# Patient Record
Sex: MALE | Race: WHITE | NOT HISPANIC OR LATINO | Employment: FULL TIME | URBAN - METROPOLITAN AREA
[De-identification: names, ages, dates, MRNs, and addresses within clinical notes are randomized per-mention and may not be internally consistent; named-entity substitution may affect disease eponyms.]

---

## 2023-03-14 ENCOUNTER — TELEPHONE (OUTPATIENT)
Dept: PRIMARY CARE | Facility: CLINIC | Age: 73
End: 2023-03-14
Payer: MEDICARE

## 2023-03-14 DIAGNOSIS — M54.50 CHRONIC LOW BACK PAIN, UNSPECIFIED BACK PAIN LATERALITY, UNSPECIFIED WHETHER SCIATICA PRESENT: ICD-10-CM

## 2023-03-14 DIAGNOSIS — M51.36 DEGENERATIVE DISC DISEASE, LUMBAR: Primary | ICD-10-CM

## 2023-03-14 DIAGNOSIS — G89.29 CHRONIC LOW BACK PAIN, UNSPECIFIED BACK PAIN LATERALITY, UNSPECIFIED WHETHER SCIATICA PRESENT: ICD-10-CM

## 2023-03-14 NOTE — TELEPHONE ENCOUNTER
Both legs are still bothering him , has done physical therapy & has another scheduled tomorrow.    He is asking about getting MRI done

## 2023-03-30 ENCOUNTER — TELEPHONE (OUTPATIENT)
Dept: PRIMARY CARE | Facility: CLINIC | Age: 73
End: 2023-03-30
Payer: MEDICARE

## 2023-03-30 DIAGNOSIS — F41.9 ANXIETY: Primary | ICD-10-CM

## 2023-03-30 RX ORDER — ALPRAZOLAM 0.5 MG/1
0.5 TABLET ORAL SEE ADMIN INSTRUCTIONS
Qty: 2 TABLET | Refills: 0 | Status: SHIPPED | OUTPATIENT
Start: 2023-03-30 | End: 2023-06-29 | Stop reason: ALTCHOICE

## 2023-03-30 NOTE — TELEPHONE ENCOUNTER
Patient has an MRI in April. Patient states he hates small spaces. Patient had  Alprazolam 0.5mg before having one done before. Requesting 2 tabs for the MRI. I tab 45 mins to 30 mins before procedure Can repeat immediately before procedure if needed.

## 2023-04-11 ENCOUNTER — TELEPHONE (OUTPATIENT)
Dept: PRIMARY CARE | Facility: CLINIC | Age: 73
End: 2023-04-11
Payer: MEDICARE

## 2023-04-11 DIAGNOSIS — M51.36 DEGENERATIVE DISC DISEASE, LUMBAR: Primary | ICD-10-CM

## 2023-04-11 RX ORDER — CELECOXIB 200 MG/1
200 CAPSULE ORAL 2 TIMES DAILY
Qty: 60 CAPSULE | Refills: 5 | Status: SHIPPED | OUTPATIENT
Start: 2023-04-11 | End: 2023-10-08

## 2023-06-28 PROBLEM — I25.10 ASHD (ARTERIOSCLEROTIC HEART DISEASE): Status: ACTIVE | Noted: 2023-06-28

## 2023-06-28 PROBLEM — Z98.61 H/O PERCUTANEOUS TRANSLUMINAL CORONARY ANGIOPLASTY: Status: ACTIVE | Noted: 2023-06-28

## 2023-06-28 PROBLEM — E55.9 VITAMIN D DEFICIENCY: Status: ACTIVE | Noted: 2023-06-28

## 2023-06-28 PROBLEM — H66.90 SUBACUTE OTITIS MEDIA: Status: RESOLVED | Noted: 2023-06-28 | Resolved: 2023-06-28

## 2023-06-28 PROBLEM — H60.90 OTITIS EXTERNA: Status: RESOLVED | Noted: 2023-06-28 | Resolved: 2023-06-28

## 2023-06-28 PROBLEM — H90.3 SENSORINEURAL HEARING LOSS, BILATERAL: Status: ACTIVE | Noted: 2023-06-28

## 2023-06-28 PROBLEM — M62.81 MUSCLE WEAKNESS OF LOWER EXTREMITY: Status: ACTIVE | Noted: 2023-06-28

## 2023-06-28 PROBLEM — E78.5 HYPERLIPIDEMIA: Status: ACTIVE | Noted: 2023-06-28

## 2023-06-28 PROBLEM — R41.841 COGNITIVE COMMUNICATION DEFICIT: Status: ACTIVE | Noted: 2023-06-28

## 2023-06-28 PROBLEM — M79.606 LOWER EXTREMITY PAIN: Status: ACTIVE | Noted: 2023-06-28

## 2023-06-28 PROBLEM — M54.9 BACK PAIN: Status: ACTIVE | Noted: 2023-06-28

## 2023-06-28 PROBLEM — Z96.21 COCHLEAR IMPLANT IN PLACE: Status: ACTIVE | Noted: 2023-06-28

## 2023-06-28 PROBLEM — H91.90 HEARING LOSS: Status: RESOLVED | Noted: 2023-06-28 | Resolved: 2023-06-28

## 2023-06-28 PROBLEM — R10.84 ABDOMINAL PAIN, ACUTE, GENERALIZED: Status: RESOLVED | Noted: 2023-06-28 | Resolved: 2023-06-28

## 2023-06-28 RX ORDER — GABAPENTIN 300 MG/1
300 CAPSULE ORAL NIGHTLY
COMMUNITY
Start: 2023-01-26 | End: 2023-06-29

## 2023-06-28 RX ORDER — ASPIRIN 81 MG/1
1 TABLET ORAL DAILY
COMMUNITY
Start: 2016-09-07 | End: 2024-04-04 | Stop reason: WASHOUT

## 2023-06-28 RX ORDER — OMEGA-3/DHA/EPA/FISH OIL 300-1000MG
1 CAPSULE,DELAYED RELEASE (ENTERIC COATED) ORAL DAILY
COMMUNITY
Start: 2016-09-06 | End: 2024-04-04 | Stop reason: WASHOUT

## 2023-06-28 RX ORDER — MULTIVITAMIN
1 TABLET ORAL DAILY
COMMUNITY
Start: 2016-09-12

## 2023-06-28 RX ORDER — CYCLOBENZAPRINE HCL 5 MG
1 TABLET ORAL NIGHTLY PRN
COMMUNITY
Start: 2023-02-15 | End: 2023-06-29

## 2023-06-28 RX ORDER — METOPROLOL TARTRATE 25 MG/1
0.5 TABLET, FILM COATED ORAL 2 TIMES DAILY
COMMUNITY
Start: 2016-05-28 | End: 2024-05-16 | Stop reason: SDUPTHER

## 2023-06-28 RX ORDER — ALIROCUMAB 75 MG/ML
INJECTION, SOLUTION SUBCUTANEOUS
COMMUNITY
End: 2024-01-30 | Stop reason: CLARIF

## 2023-06-29 ENCOUNTER — OFFICE VISIT (OUTPATIENT)
Dept: PRIMARY CARE | Facility: CLINIC | Age: 73
End: 2023-06-29
Payer: MEDICARE

## 2023-06-29 VITALS
BODY MASS INDEX: 28.61 KG/M2 | HEIGHT: 66 IN | SYSTOLIC BLOOD PRESSURE: 124 MMHG | WEIGHT: 178 LBS | DIASTOLIC BLOOD PRESSURE: 60 MMHG | HEART RATE: 49 BPM

## 2023-06-29 DIAGNOSIS — Z12.5 PROSTATE CANCER SCREENING: ICD-10-CM

## 2023-06-29 DIAGNOSIS — Z96.21 COCHLEAR IMPLANT IN PLACE: ICD-10-CM

## 2023-06-29 DIAGNOSIS — E78.2 MIXED HYPERLIPIDEMIA: Primary | ICD-10-CM

## 2023-06-29 DIAGNOSIS — M54.50 LOW BACK PAIN, UNSPECIFIED BACK PAIN LATERALITY, UNSPECIFIED CHRONICITY, UNSPECIFIED WHETHER SCIATICA PRESENT: ICD-10-CM

## 2023-06-29 DIAGNOSIS — E55.9 VITAMIN D DEFICIENCY: ICD-10-CM

## 2023-06-29 DIAGNOSIS — I25.10 ASHD (ARTERIOSCLEROTIC HEART DISEASE): ICD-10-CM

## 2023-06-29 PROCEDURE — 1160F RVW MEDS BY RX/DR IN RCRD: CPT | Performed by: CLINICAL NURSE SPECIALIST

## 2023-06-29 PROCEDURE — 1036F TOBACCO NON-USER: CPT | Performed by: CLINICAL NURSE SPECIALIST

## 2023-06-29 PROCEDURE — 99214 OFFICE O/P EST MOD 30 MIN: CPT | Performed by: CLINICAL NURSE SPECIALIST

## 2023-06-29 PROCEDURE — 1159F MED LIST DOCD IN RCRD: CPT | Performed by: CLINICAL NURSE SPECIALIST

## 2023-06-29 ASSESSMENT — ENCOUNTER SYMPTOMS
DYSURIA: 0
BLOOD IN STOOL: 0
OCCASIONAL FEELINGS OF UNSTEADINESS: 0
SHORTNESS OF BREATH: 0
PALPITATIONS: 0
DIZZINESS: 0
WHEEZING: 0
CHILLS: 0
HEADACHES: 0
FEVER: 0
SLEEP DISTURBANCE: 0
MYALGIAS: 0
CONFUSION: 0
NECK PAIN: 0
FLANK PAIN: 0
SEIZURES: 0
FATIGUE: 0
DEPRESSION: 0
LOSS OF SENSATION IN FEET: 0
JOINT SWELLING: 0
BRUISES/BLEEDS EASILY: 0
APPETITE CHANGE: 0
PHOTOPHOBIA: 0
VOMITING: 0
UNEXPECTED WEIGHT CHANGE: 0
CONSTIPATION: 0
EYE PAIN: 0
NAUSEA: 0
ARTHRALGIAS: 1
POLYDIPSIA: 0
SORE THROAT: 0
ABDOMINAL PAIN: 0
COUGH: 0
CHEST TIGHTNESS: 0
TROUBLE SWALLOWING: 0
ACTIVITY CHANGE: 0
HEMATURIA: 0
BACK PAIN: 1
WOUND: 0
DIARRHEA: 0

## 2023-06-29 ASSESSMENT — COLUMBIA-SUICIDE SEVERITY RATING SCALE - C-SSRS
1. IN THE PAST MONTH, HAVE YOU WISHED YOU WERE DEAD OR WISHED YOU COULD GO TO SLEEP AND NOT WAKE UP?: NO
6. HAVE YOU EVER DONE ANYTHING, STARTED TO DO ANYTHING, OR PREPARED TO DO ANYTHING TO END YOUR LIFE?: NO
2. HAVE YOU ACTUALLY HAD ANY THOUGHTS OF KILLING YOURSELF?: NO

## 2023-06-29 ASSESSMENT — PATIENT HEALTH QUESTIONNAIRE - PHQ9
SUM OF ALL RESPONSES TO PHQ9 QUESTIONS 1 AND 2: 0
1. LITTLE INTEREST OR PLEASURE IN DOING THINGS: NOT AT ALL
2. FEELING DOWN, DEPRESSED OR HOPELESS: NOT AT ALL

## 2023-06-29 NOTE — PROGRESS NOTES
Subjective   Patient ID: Joe Pathak is a 72 y.o. male who presents for Follow-up (Follow up).  HPI    Here today as a follow up appointment.     Follows with Dr. Hernandez for Cardiology. CAD PCI in the past. Has been stable on medication, intolerant to Statins. No changes made at last OV in February 2023, following annually.     Patient states that he has developed lower extremity pain. Extending from the buttocks down his legs. Has taken OTC pain medication with some improvement. Worse when trying to sleep at night. Patient states that when he is up in moving will help with his symptoms. No improvement with Gabapentin. Completed PT with minimal improvement. Flexeril PRN. Has noticed improvement with Celebrex. MRI completed.    Review of Systems   Constitutional:  Negative for activity change, appetite change, chills, fatigue, fever and unexpected weight change.   HENT:  Negative for ear pain, hearing loss, nosebleeds, sore throat, tinnitus and trouble swallowing.    Eyes:  Negative for photophobia, pain and visual disturbance.   Respiratory:  Negative for cough, chest tightness, shortness of breath and wheezing.    Cardiovascular:  Negative for chest pain, palpitations and leg swelling.   Gastrointestinal:  Negative for abdominal pain, blood in stool, constipation, diarrhea, nausea and vomiting.   Endocrine: Negative for cold intolerance, heat intolerance, polydipsia and polyuria.   Genitourinary:  Negative for dysuria, flank pain and hematuria.   Musculoskeletal:  Positive for arthralgias and back pain. Negative for joint swelling, myalgias and neck pain.   Skin:  Negative for pallor, rash and wound.   Allergic/Immunologic: Negative for immunocompromised state.   Neurological:  Negative for dizziness, seizures and headaches.   Hematological:  Does not bruise/bleed easily.   Psychiatric/Behavioral:  Negative for confusion and sleep disturbance.        Objective   Physical Exam  Vitals and nursing note  reviewed.   Constitutional:       General: He is not in acute distress.     Appearance: Normal appearance.   HENT:      Head: Normocephalic.      Nose: Nose normal.   Eyes:      Conjunctiva/sclera: Conjunctivae normal.   Neck:      Vascular: No carotid bruit.   Cardiovascular:      Rate and Rhythm: Normal rate and regular rhythm.      Pulses: Normal pulses.      Heart sounds: Normal heart sounds.   Pulmonary:      Effort: Pulmonary effort is normal.      Breath sounds: Normal breath sounds.   Abdominal:      General: Bowel sounds are normal.      Palpations: Abdomen is soft.   Musculoskeletal:         General: Normal range of motion.      Cervical back: Normal range of motion.   Skin:     General: Skin is warm and dry.   Neurological:      Mental Status: He is alert and oriented to person, place, and time. Mental status is at baseline.   Psychiatric:         Mood and Affect: Mood normal.         Behavior: Behavior normal.       Assessment/Plan        Reviewed lab work completed with patient.     Coronary artery disease with history of stent placement 2013. Currently asymptomatic. He continues on aspirin and beta-blocker. He has been statin intolerant. Blood pressure well controlled. He has follow-up with cardiology scheduled.   Dyslipidemia. History of statin myopathy. Currently doing well on Praluent since 2020.   Hearing loss status post cochlear implant. Following with Specialist.   Lower Extremity Pain, Back Pain: Discontinued Gabapentin without relief. Flexeril PRN. MRI completed. Continue Celebrex.     Colonoscopy August 2014 normal, repeat in 10 years.  PSA: February 2022. To be done with next lab work.   COVID Vaccine: December 2022.   Medicare Wellness: December 2022. To be done at follow up appointment.   Pneumovax: April 2021.   Discussed Prevnar, Shingrix, and Tdap.

## 2023-10-25 ENCOUNTER — TELEPHONE (OUTPATIENT)
Dept: PRIMARY CARE | Facility: CLINIC | Age: 73
End: 2023-10-25
Payer: MEDICARE

## 2023-10-25 DIAGNOSIS — M54.50 LOW BACK PAIN, UNSPECIFIED BACK PAIN LATERALITY, UNSPECIFIED CHRONICITY, UNSPECIFIED WHETHER SCIATICA PRESENT: ICD-10-CM

## 2023-10-25 RX ORDER — CELECOXIB 200 MG/1
200 CAPSULE ORAL 2 TIMES DAILY
Qty: 180 CAPSULE | Refills: 1 | Status: SHIPPED | OUTPATIENT
Start: 2023-10-25 | End: 2024-04-22

## 2023-11-20 ENCOUNTER — TELEPHONE (OUTPATIENT)
Dept: CARDIOLOGY | Facility: CLINIC | Age: 73
End: 2023-11-20
Payer: MEDICARE

## 2023-11-20 DIAGNOSIS — I49.8 FLUTTERING HEART: ICD-10-CM

## 2023-11-20 DIAGNOSIS — I25.10 ASHD (ARTERIOSCLEROTIC HEART DISEASE): Primary | ICD-10-CM

## 2023-11-20 NOTE — TELEPHONE ENCOUNTER
"Received message from Naseem, \"Percy stopped down stating that this weekend he noticed his heart fluttering. It would come and go throughout the day. He states that it felt like that when he needed his stents. He denies any other symptoms. Just the weird feeling in his chest. \"  "

## 2023-11-20 NOTE — TELEPHONE ENCOUNTER
Last seen 2/27/23 by Dr. Hernandez and has PMH is significant for CAD s/p PCI to proximal LAD and proximal mid LAD in 2013.  Next scheduled appointment with Dr. Hernandez 2/27/24

## 2023-11-20 NOTE — TELEPHONE ENCOUNTER
I called and spoke with patient and went over orders recommended by Dr. Hernandez. He verbalized understanding. Dr. Hernandez said exercise stress echo if he can walk on treadmill. Orders placed and Estephania notified.

## 2023-11-20 NOTE — TELEPHONE ENCOUNTER
Percy stopped down stating that this weekend he noticed his heart fluttering. It would come and go throughout the day. He states that it felt like that when he needed his stents. He denies any other symptoms. Just the weird feeling in his chest.

## 2023-11-22 ENCOUNTER — CLINICAL SUPPORT (OUTPATIENT)
Dept: CARDIOLOGY | Facility: CLINIC | Age: 73
End: 2023-11-22
Payer: MEDICARE

## 2023-11-22 DIAGNOSIS — I49.8 FLUTTERING HEART: ICD-10-CM

## 2023-11-22 PROCEDURE — 93246 EXT ECG>7D<15D RECORDING: CPT | Performed by: INTERNAL MEDICINE

## 2023-12-12 ENCOUNTER — HOSPITAL ENCOUNTER (OUTPATIENT)
Dept: CARDIOLOGY | Facility: HOSPITAL | Age: 73
Discharge: HOME | End: 2023-12-12
Payer: MEDICARE

## 2023-12-12 DIAGNOSIS — I25.10 ASHD (ARTERIOSCLEROTIC HEART DISEASE): ICD-10-CM

## 2023-12-12 PROCEDURE — 2500000004 HC RX 250 GENERAL PHARMACY W/ HCPCS (ALT 636 FOR OP/ED): Performed by: INTERNAL MEDICINE

## 2023-12-12 PROCEDURE — 93016 CV STRESS TEST SUPVJ ONLY: CPT | Performed by: INTERNAL MEDICINE

## 2023-12-12 PROCEDURE — 93018 CV STRESS TEST I&R ONLY: CPT | Performed by: INTERNAL MEDICINE

## 2023-12-12 PROCEDURE — 93017 CV STRESS TEST TRACING ONLY: CPT

## 2023-12-12 PROCEDURE — 93350 STRESS TTE ONLY: CPT | Performed by: INTERNAL MEDICINE

## 2023-12-12 RX ADMIN — PERFLUTREN 2 ML OF DILUTION: 6.52 INJECTION, SUSPENSION INTRAVENOUS at 10:28

## 2023-12-29 ENCOUNTER — OFFICE VISIT (OUTPATIENT)
Dept: PRIMARY CARE | Facility: CLINIC | Age: 73
End: 2023-12-29
Payer: MEDICARE

## 2023-12-29 VITALS
BODY MASS INDEX: 29.32 KG/M2 | HEART RATE: 62 BPM | WEIGHT: 182.4 LBS | OXYGEN SATURATION: 93 % | HEIGHT: 66 IN | DIASTOLIC BLOOD PRESSURE: 79 MMHG | SYSTOLIC BLOOD PRESSURE: 134 MMHG

## 2023-12-29 DIAGNOSIS — M79.606 PAIN OF LOWER EXTREMITY, UNSPECIFIED LATERALITY: ICD-10-CM

## 2023-12-29 DIAGNOSIS — M54.50 LOW BACK PAIN, UNSPECIFIED BACK PAIN LATERALITY, UNSPECIFIED CHRONICITY, UNSPECIFIED WHETHER SCIATICA PRESENT: ICD-10-CM

## 2023-12-29 DIAGNOSIS — Z96.21 COCHLEAR IMPLANT IN PLACE: ICD-10-CM

## 2023-12-29 DIAGNOSIS — I25.10 ASHD (ARTERIOSCLEROTIC HEART DISEASE): Primary | ICD-10-CM

## 2023-12-29 DIAGNOSIS — E78.2 MIXED HYPERLIPIDEMIA: ICD-10-CM

## 2023-12-29 DIAGNOSIS — Z00.00 MEDICARE ANNUAL WELLNESS VISIT, SUBSEQUENT: ICD-10-CM

## 2023-12-29 PROCEDURE — G0444 DEPRESSION SCREEN ANNUAL: HCPCS | Performed by: CLINICAL NURSE SPECIALIST

## 2023-12-29 PROCEDURE — 99213 OFFICE O/P EST LOW 20 MIN: CPT | Performed by: CLINICAL NURSE SPECIALIST

## 2023-12-29 PROCEDURE — G0439 PPPS, SUBSEQ VISIT: HCPCS | Performed by: CLINICAL NURSE SPECIALIST

## 2023-12-29 PROCEDURE — 1170F FXNL STATUS ASSESSED: CPT | Performed by: CLINICAL NURSE SPECIALIST

## 2023-12-29 PROCEDURE — 1160F RVW MEDS BY RX/DR IN RCRD: CPT | Performed by: CLINICAL NURSE SPECIALIST

## 2023-12-29 PROCEDURE — 1159F MED LIST DOCD IN RCRD: CPT | Performed by: CLINICAL NURSE SPECIALIST

## 2023-12-29 PROCEDURE — 1036F TOBACCO NON-USER: CPT | Performed by: CLINICAL NURSE SPECIALIST

## 2023-12-29 RX ORDER — GLUCOSAM/CHONDRO/HERB 149/HYAL 750-100 MG
1 TABLET ORAL EVERY 24 HOURS
COMMUNITY
End: 2024-04-04 | Stop reason: WASHOUT

## 2023-12-29 RX ORDER — UBIDECARENONE 30 MG
30 CAPSULE ORAL EVERY 24 HOURS
COMMUNITY

## 2023-12-29 RX ORDER — CLOPIDOGREL BISULFATE 75 MG/1
75 TABLET ORAL EVERY 24 HOURS
COMMUNITY

## 2023-12-29 RX ORDER — NAPROXEN SODIUM 220 MG/1
81 TABLET, FILM COATED ORAL EVERY 24 HOURS
COMMUNITY

## 2023-12-29 ASSESSMENT — ENCOUNTER SYMPTOMS
LOSS OF SENSATION IN FEET: 0
WHEEZING: 0
DEPRESSION: 0
DIZZINESS: 0
VOMITING: 0
EYE PAIN: 0
CHILLS: 0
FATIGUE: 0
DIARRHEA: 0
UNEXPECTED WEIGHT CHANGE: 0
CONFUSION: 0
POLYDIPSIA: 0
TROUBLE SWALLOWING: 0
SLEEP DISTURBANCE: 0
SEIZURES: 0
ACTIVITY CHANGE: 0
DYSURIA: 0
SORE THROAT: 0
OCCASIONAL FEELINGS OF UNSTEADINESS: 0
ARTHRALGIAS: 1
PALPITATIONS: 0
HEMATURIA: 0
NAUSEA: 0
HEADACHES: 0
APPETITE CHANGE: 0
CONSTIPATION: 0
MYALGIAS: 0
PHOTOPHOBIA: 0
JOINT SWELLING: 0
NECK PAIN: 0
BACK PAIN: 1
BLOOD IN STOOL: 0
FEVER: 0
ABDOMINAL PAIN: 0
WOUND: 0
COUGH: 0
CHEST TIGHTNESS: 0
SHORTNESS OF BREATH: 0
BRUISES/BLEEDS EASILY: 0
FLANK PAIN: 0

## 2023-12-29 ASSESSMENT — ACTIVITIES OF DAILY LIVING (ADL)
DRESSING: INDEPENDENT
MANAGING_FINANCES: INDEPENDENT
BATHING: INDEPENDENT
TAKING_MEDICATION: INDEPENDENT
DOING_HOUSEWORK: INDEPENDENT
GROCERY_SHOPPING: INDEPENDENT

## 2023-12-29 ASSESSMENT — PATIENT HEALTH QUESTIONNAIRE - PHQ9
SUM OF ALL RESPONSES TO PHQ9 QUESTIONS 1 AND 2: 0
2. FEELING DOWN, DEPRESSED OR HOPELESS: NOT AT ALL
1. LITTLE INTEREST OR PLEASURE IN DOING THINGS: NOT AT ALL

## 2023-12-29 NOTE — PROGRESS NOTES
Subjective   Reason for Visit: Joe Pathak is an 73 y.o. male here for a Medicare Wellness visit.          Reviewed all medications by prescribing practitioner or clinical pharmacist (such as prescriptions, OTCs, herbal therapies and supplements) and documented in the medical record.    HPI    Here today as a follow up appointment. Due for Medicare Wellness.      Follows with Dr. Hernandez for Cardiology. CAD PCI in the past. Has been stable on medication, intolerant to Statins. No changes made at last OV in February 2023, following annually.      Patient states that he has developed lower extremity pain. Extending from the buttocks down his legs. Has taken OTC pain medication with some improvement. Worse when trying to sleep at night. Patient states that when he is up in moving will help with his symptoms. No improvement with Gabapentin. Completed PT with minimal improvement. Flexeril PRN. Has noticed improvement with Celebrex. MRI completed.     Was having complaints of heart palpitations and fluttering. Stress/ECHO and Holter completed. States that after the Holter was placed no further issues/concerns. Has been feeling well.   Patient Care Team:  LOURDES Abraham as PCP - General (Internal Medicine)  LOURDES Abraham as PCP - [facility] Attributed Provider     Review of Systems   Constitutional:  Negative for activity change, appetite change, chills, fatigue, fever and unexpected weight change.   HENT:  Positive for hearing loss. Negative for ear pain, nosebleeds, sore throat, tinnitus and trouble swallowing.    Eyes:  Negative for photophobia, pain and visual disturbance.   Respiratory:  Negative for cough, chest tightness, shortness of breath and wheezing.    Cardiovascular:  Negative for chest pain, palpitations and leg swelling.   Gastrointestinal:  Negative for abdominal pain, blood in stool, constipation, diarrhea, nausea and vomiting.   Endocrine: Negative for cold intolerance, heat  "intolerance, polydipsia and polyuria.   Genitourinary:  Negative for dysuria, flank pain and hematuria.   Musculoskeletal:  Positive for arthralgias and back pain. Negative for joint swelling, myalgias and neck pain.   Skin:  Negative for pallor, rash and wound.   Allergic/Immunologic: Negative for immunocompromised state.   Neurological:  Negative for dizziness, seizures and headaches.   Hematological:  Does not bruise/bleed easily.   Psychiatric/Behavioral:  Negative for confusion and sleep disturbance.        Objective   Vitals:  /79 (BP Location: Right arm, Patient Position: Sitting)   Pulse 62   Ht 1.676 m (5' 6\")   Wt 82.7 kg (182 lb 6.4 oz)   SpO2 93%   BMI 29.44 kg/m²       Physical Exam  Vitals and nursing note reviewed.   Constitutional:       General: He is not in acute distress.     Appearance: Normal appearance.   HENT:      Head: Normocephalic.      Nose: Nose normal.   Eyes:      Conjunctiva/sclera: Conjunctivae normal.   Neck:      Vascular: No carotid bruit.   Cardiovascular:      Rate and Rhythm: Normal rate and regular rhythm.      Pulses: Normal pulses.      Heart sounds: Normal heart sounds.   Pulmonary:      Effort: Pulmonary effort is normal.      Breath sounds: Normal breath sounds.   Abdominal:      General: Bowel sounds are normal.      Palpations: Abdomen is soft.   Musculoskeletal:         General: Normal range of motion.      Cervical back: Normal range of motion.   Skin:     General: Skin is warm and dry.   Neurological:      Mental Status: He is alert and oriented to person, place, and time. Mental status is at baseline.   Psychiatric:         Mood and Affect: Mood normal.         Behavior: Behavior normal.       Assessment/Plan   Problem List Items Addressed This Visit          Cardiac and Vasculature    Hyperlipidemia       Has an order for updated blood work.      Coronary artery disease with history of stent placement 2013. Currently asymptomatic. He continues on aspirin " and beta-blocker. He has been statin intolerant. Blood pressure well controlled. He has follow-up with cardiology scheduled.   Dyslipidemia. History of statin myopathy. Currently doing well on Praluent since 2020.   Hearing loss status post cochlear implant. Following with Specialist.   Lower Extremity Pain, Back Pain: Discontinued Gabapentin without relief. Flexeril PRN. MRI completed. Continue Celebrex.   Medicare Wellness: Routine and age appropriate recommendations discussed with the patient today and patient verbalized understanding of the recommendations.  Questions answered.  Age appropriate immunizations and preventative screenings discussed with the patient and ordered as appropriate. Labs updated and ordered as indicated. Recommend healthy diet and daily exercise to maintain healthy body weight.      Colonoscopy August 2014 normal, repeat in 10 years.  PSA: February 2022. To be done with next lab work.   COVID Vaccine: December 2023.   Medicare Wellness: December 2023.   Pneumovax: April 2021.   Flu Vaccine: Fall 2023.   RSV Vaccine: December 2023.   Discussed Prevnar, Shingrix, and Tdap.

## 2024-01-05 ENCOUNTER — CLINICAL SUPPORT (OUTPATIENT)
Dept: PRIMARY CARE | Facility: CLINIC | Age: 74
End: 2024-01-05
Payer: MEDICARE

## 2024-01-05 DIAGNOSIS — Z23 NEED FOR PNEUMOCOCCAL 20-VALENT CONJUGATE VACCINATION: ICD-10-CM

## 2024-01-05 PROCEDURE — 90677 PCV20 VACCINE IM: CPT | Performed by: CLINICAL NURSE SPECIALIST

## 2024-01-05 PROCEDURE — G0009 ADMIN PNEUMOCOCCAL VACCINE: HCPCS | Performed by: CLINICAL NURSE SPECIALIST

## 2024-01-26 DIAGNOSIS — E78.2 MIXED HYPERLIPIDEMIA: Primary | ICD-10-CM

## 2024-01-26 DIAGNOSIS — I25.10 ASHD (ARTERIOSCLEROTIC HEART DISEASE): ICD-10-CM

## 2024-01-26 NOTE — TELEPHONE ENCOUNTER
----- Message from Xavi Mccray sent at 1/26/2024 12:35 PM EST -----  Regarding: Insurance is requiring a medication change  Patient stopped by the office to tell us that his insurance will no longer cover the Praluent Pen 75 mg/mL pen injector.  They require him to be moved to [facility].  Please send to [facility].    He has two weeks until he needs the Repatha because he took his last injection of Praluent today and it is good for two weeks.    Thank you

## 2024-01-30 RX ORDER — EVOLOCUMAB 140 MG/ML
140 INJECTION, SOLUTION SUBCUTANEOUS
Qty: 6 ML | Refills: 0 | Status: SHIPPED | OUTPATIENT
Start: 2024-01-30 | End: 2024-06-11 | Stop reason: SDUPTHER

## 2024-01-30 NOTE — TELEPHONE ENCOUNTER
Last Appointment: 2/27/23 with Dr. Hernandez  Next Appointment: 2/27/24 with Dr. Hernandez  Last Labs: 1/4/23 and PCP ordered labs, but not done. I called and let patient know these should be completed, and he agreed.  Last Refilled: 1/23/23 90 days 3 refills      Ok to change to Repatha due to insurance? If so, Repatha ready to route to pharmacy.

## 2024-02-02 ENCOUNTER — LAB (OUTPATIENT)
Dept: LAB | Facility: LAB | Age: 74
End: 2024-02-02
Payer: MEDICARE

## 2024-02-02 ENCOUNTER — TELEPHONE (OUTPATIENT)
Dept: PRIMARY CARE | Facility: CLINIC | Age: 74
End: 2024-02-02

## 2024-02-02 DIAGNOSIS — Z12.5 PROSTATE CANCER SCREENING: ICD-10-CM

## 2024-02-02 DIAGNOSIS — E78.2 MIXED HYPERLIPIDEMIA: ICD-10-CM

## 2024-02-02 DIAGNOSIS — E55.9 VITAMIN D DEFICIENCY: ICD-10-CM

## 2024-02-02 DIAGNOSIS — I25.10 ASHD (ARTERIOSCLEROTIC HEART DISEASE): ICD-10-CM

## 2024-02-02 LAB
25(OH)D3 SERPL-MCNC: 33 NG/ML (ref 30–100)
ALBUMIN SERPL BCP-MCNC: 4.5 G/DL (ref 3.4–5)
ALP SERPL-CCNC: 43 U/L (ref 33–136)
ALT SERPL W P-5'-P-CCNC: 23 U/L (ref 10–52)
ANION GAP SERPL CALC-SCNC: 13 MMOL/L (ref 10–20)
AST SERPL W P-5'-P-CCNC: 25 U/L (ref 9–39)
BILIRUB SERPL-MCNC: 0.7 MG/DL (ref 0–1.2)
BUN SERPL-MCNC: 22 MG/DL (ref 6–23)
CALCIUM SERPL-MCNC: 9.1 MG/DL (ref 8.6–10.3)
CHLORIDE SERPL-SCNC: 103 MMOL/L (ref 98–107)
CHOLEST SERPL-MCNC: 136 MG/DL (ref 0–199)
CHOLESTEROL/HDL RATIO: 2.5
CO2 SERPL-SCNC: 25 MMOL/L (ref 21–32)
CREAT SERPL-MCNC: 0.92 MG/DL (ref 0.5–1.3)
EGFRCR SERPLBLD CKD-EPI 2021: 87 ML/MIN/1.73M*2
ERYTHROCYTE [DISTWIDTH] IN BLOOD BY AUTOMATED COUNT: 13.7 % (ref 11.5–14.5)
GLUCOSE SERPL-MCNC: 96 MG/DL (ref 74–99)
HCT VFR BLD AUTO: 46.8 % (ref 41–52)
HDLC SERPL-MCNC: 54.6 MG/DL
HGB BLD-MCNC: 14.9 G/DL (ref 13.5–17.5)
LDLC SERPL CALC-MCNC: 66 MG/DL
MCH RBC QN AUTO: 31.1 PG (ref 26–34)
MCHC RBC AUTO-ENTMCNC: 31.8 G/DL (ref 32–36)
MCV RBC AUTO: 98 FL (ref 80–100)
NON HDL CHOLESTEROL: 81 MG/DL (ref 0–149)
NRBC BLD-RTO: 0 /100 WBCS (ref 0–0)
PLATELET # BLD AUTO: 249 X10*3/UL (ref 150–450)
POTASSIUM SERPL-SCNC: 5.1 MMOL/L (ref 3.5–5.3)
PROT SERPL-MCNC: 7.4 G/DL (ref 6.4–8.2)
PSA SERPL-MCNC: 0.43 NG/ML
RBC # BLD AUTO: 4.79 X10*6/UL (ref 4.5–5.9)
SODIUM SERPL-SCNC: 136 MMOL/L (ref 136–145)
TRIGL SERPL-MCNC: 76 MG/DL (ref 0–149)
TSH SERPL-ACNC: 2.82 MIU/L (ref 0.44–3.98)
VIT B12 SERPL-MCNC: 436 PG/ML (ref 211–911)
VLDL: 15 MG/DL (ref 0–40)
WBC # BLD AUTO: 4.6 X10*3/UL (ref 4.4–11.3)

## 2024-02-02 PROCEDURE — G0103 PSA SCREENING: HCPCS

## 2024-02-02 PROCEDURE — 85027 COMPLETE CBC AUTOMATED: CPT

## 2024-02-02 PROCEDURE — 80061 LIPID PANEL: CPT

## 2024-02-02 PROCEDURE — 82607 VITAMIN B-12: CPT

## 2024-02-02 PROCEDURE — 84443 ASSAY THYROID STIM HORMONE: CPT

## 2024-02-02 PROCEDURE — 80053 COMPREHEN METABOLIC PANEL: CPT

## 2024-02-02 PROCEDURE — 36415 COLL VENOUS BLD VENIPUNCTURE: CPT

## 2024-02-02 PROCEDURE — 82306 VITAMIN D 25 HYDROXY: CPT

## 2024-02-02 NOTE — TELEPHONE ENCOUNTER
----- Message from MARANDA Abraham-CNS sent at 2/2/2024 12:59 PM EST -----  Please let patient know that blood work is stable. No changes needed at this time. Plan to repeat CMP prior to follow up appointment. Thank you!

## 2024-02-21 ENCOUNTER — ANCILLARY PROCEDURE (OUTPATIENT)
Dept: CARDIOLOGY | Facility: CLINIC | Age: 74
End: 2024-02-21
Payer: MEDICARE

## 2024-02-21 DIAGNOSIS — I49.8 FLUTTERING HEART: ICD-10-CM

## 2024-02-21 DIAGNOSIS — I25.10 ASHD (ARTERIOSCLEROTIC HEART DISEASE): ICD-10-CM

## 2024-02-21 PROCEDURE — 93248 EXT ECG>7D<15D REV&INTERPJ: CPT | Performed by: INTERNAL MEDICINE

## 2024-02-21 PROCEDURE — 93246 EXT ECG>7D<15D RECORDING: CPT | Performed by: INTERNAL MEDICINE

## 2024-03-29 PROBLEM — R94.39 ABNORMAL CARDIOVASCULAR STRESS TEST: Status: ACTIVE | Noted: 2024-03-29

## 2024-03-29 PROBLEM — H60.549 ECZEMA OF EXTERNAL AUDITORY CANAL: Status: ACTIVE | Noted: 2020-11-02

## 2024-03-29 PROBLEM — R48.9 SYMBOLIC DYSFUNCTION: Status: ACTIVE | Noted: 2023-01-04

## 2024-03-29 PROBLEM — H93.13 BILATERAL TINNITUS: Status: ACTIVE | Noted: 2020-11-02

## 2024-03-29 PROBLEM — Z86.718 HISTORY OF DEEP VENOUS THROMBOSIS: Status: ACTIVE | Noted: 2024-03-29

## 2024-03-29 PROBLEM — J44.9 CHRONIC OBSTRUCTIVE PULMONARY DISEASE (MULTI): Status: ACTIVE | Noted: 2024-03-29

## 2024-04-03 NOTE — PROGRESS NOTES
Counseling:  The patient was counseled regarding diagnostic results, instructions for management, risk factor reductions, prognosis, patient and family education, impressions, risks and benefits of treatment options and importance of compliance with treatment.      Chief Complaint:   The patient presents today for overdue annual followup of CAD and palpitations.     History Of Present Illness:    Joe Pathak is a 73 year old male patient who presents today for overdue annual followup of CAD and palpitations. His PMH is significant for CAD s/p PCI to proximal LAD and proximal mid LAD in 2013, hyperlipidemia and sensorineural hearing loss with cochlear implant. Over the past year, the patient states that he has done well from a cardiac standpoint. He denies any CP, chest discomfort, SOB or palpitations. BP has been stable. EKG today shows NSR with no acute changes. The patient is compliant with his prescribed medications.        Last Recorded Vitals:  Vitals:    04/04/24 1348   BP: 132/80   Pulse: 73       Past Surgical History:  He has a past surgical history that includes Coronary angioplasty (09/06/2016); Tonsillectomy (09/12/2016); and Back surgery (09/12/2016).      Social History:  He reports that he has never smoked. He has never used smokeless tobacco. He reports that he does not drink alcohol and does not use drugs.    Family History:  No family history on file.     Allergies:  Atorvastatin, Lovastatin, Niacin, Rosuvastatin, and Statins-hmg-coa reductase inhibitors    Outpatient Medications:  Current Outpatient Medications   Medication Instructions    aspirin 81 mg EC tablet 1 tablet, oral, Daily    aspirin 81 mg, oral, Every 24 hours    celecoxib (CELEBREX) 200 mg, oral, 2 times daily    clopidogrel (PLAVIX) 75 mg, oral, Every 24 hours    co-enzyme Q-10 (COQ-10) 30 mg, oral, Every 24 hours    DOCOSAHEXAENOIC ACID ORAL omega 3-dha-epa-fish oil (Fish OiL) 1,000 mg (120 mg-180 mg) capsule 1 capsule (1,000  mg) once every 24 hours. 0 Active    fish,bora,flax oils-om3,6,9no1 (Omega 3-6-9 Complex) 400-400-400 mg capsule 1 capsule, oral, Daily    fluocinolone (DermOtic) 0.01 % ear drops INSTILL 5 DROPS INTO AFFECTED EAR(S) BY OTIC ROUTE QD PRN ITCHNG    metoprolol tartrate (Lopressor) 25 mg tablet 0.5 tablets, oral, 2 times daily    multivitamin with folic acid (One Daily Multivitamin) 400 mcg tablet 1 tablet, oral, Daily    omega 3-dha-epa-fish oil (Fish OiL) 1,000 mg (120 mg-180 mg) capsule 1 capsule, Every 24 hours    Repatha SureClick 140 mg, subcutaneous, Every 14 days     Review of Systems   All other systems reviewed and are negative.     Physical Exam:  Constitutional:       Appearance: Healthy appearance. Not in distress.   Neck:      Vascular: No JVR. JVD normal.   Pulmonary:      Effort: Pulmonary effort is normal.      Breath sounds: Normal breath sounds. No wheezing. No rhonchi. No rales.   Chest:      Chest wall: Not tender to palpatation.   Cardiovascular:      PMI at left midclavicular line. Normal rate. Regular rhythm. Normal S1. Normal S2.       Murmurs: There is no murmur.      No gallop.  No click. No rub.   Pulses:     Intact distal pulses.   Edema:     Peripheral edema absent.   Abdominal:      General: Bowel sounds are normal.      Palpations: Abdomen is soft.      Tenderness: There is no abdominal tenderness.   Musculoskeletal: Normal range of motion.         General: No tenderness. Skin:     General: Skin is warm and dry.   Neurological:      General: No focal deficit present.      Mental Status: Alert and oriented to person, place and time.          Last Labs:  CBC -  Lab Results   Component Value Date    WBC 4.6 02/02/2024    HGB 14.9 02/02/2024    HCT 46.8 02/02/2024    MCV 98 02/02/2024     02/02/2024       CMP -  Lab Results   Component Value Date    CALCIUM 9.1 02/02/2024    PROT 7.4 02/02/2024    ALBUMIN 4.5 02/02/2024    AST 25 02/02/2024    ALT 23 02/02/2024    ALKPHOS 43  02/02/2024    BILITOT 0.7 02/02/2024       LIPID PANEL -   Lab Results   Component Value Date    CHOL 136 02/02/2024    TRIG 76 02/02/2024    HDL 54.6 02/02/2024    CHHDL 2.5 02/02/2024    LDLF 63 01/04/2023    VLDL 15 02/02/2024    NHDL 81 02/02/2024       RENAL FUNCTION PANEL -   Lab Results   Component Value Date    GLUCOSE 96 02/02/2024     02/02/2024    K 5.1 02/02/2024     02/02/2024    CO2 25 02/02/2024    ANIONGAP 13 02/02/2024    BUN 22 02/02/2024    CREATININE 0.92 02/02/2024    GFRMALE 86 01/04/2023    CALCIUM 9.1 02/02/2024    ALBUMIN 4.5 02/02/2024        Last Cardiology Tests:  12/12/2023 - Exercise Stress Echo  1. No clinical or electrocardiographic evidence for ischemia at maximal workload.  2. Adequate level of stress achieved.  3. Normal global left ventricular systolic function.  4. The resting ejection fraction was estimated at 55 to 60% with a peak exercise ejection fraction estimated at 60 to 65%.  5. There were no stress-induced wall motion abnormalities. This is a negative stress echo test for ischemia.    11/22/2023 to 12/06/2023 - Holter Monitor  1. Predominant underlying rhythm was sinus rhythm; min HR 45 bpm, max  bpm, avg HR 72 bpm.  2. 7 supraventricular tachycardia runs occurred; fastest interval lasting 5 beats with max rate 174 bpm, longest lasting 17 b eats with avg rate 125 bpm.  3. Isolated SVEs were rare, SVE couplets were rare, and SVE triplets were rare.  4. Isolated VEs were rare, no VE couplets were present, and VE triplets were rare.    01/22/2013 - Cardiac Catheterization  1. Severe two vessel coronary artery disease.  2. Successful stenting of the 70% stenosis in the proximal left anterior descending artery.  3. Successful angioplasty and successful stenting of the 99% stenosis in the proximal to mid left anterior descending artery.  4. 60% stenosis in the distal right coronary artery.  5. The left ventricular ejection fraction was 60%.  6. Normal left  ventricular size and contractility.     Lab review: I have personally reviewed the laboratory result(s).    Assessment/Plan   1) CAD s/p PCI in Past  On ASA 81 mg daily, Plavix 75 mg daily, Repatha 140 mg/mL every 2 weeks, metoprolol tartrate 12.5 mg BID  Exercise stress echo 12/12/2023 negative for ischemia   Lipid panel 02/02/2024 with LDL of 66  Denies CP, chest discomfort or SOB  BP stable  EKG stable  F/U 1 year     2) Palpitations  On metoprolol tartrate 12.5 mg BID  Holter 11/22/2023 to 12/06/2023 with 7 runs of SVT  Denies palpitations currently   F/U 1 year       Scribe Attestation  By signing my name below, I, Clayton Marte   attest that this documentation has been prepared under the direction and in the presence of Benji Hernandez MD.

## 2024-04-04 ENCOUNTER — OFFICE VISIT (OUTPATIENT)
Dept: CARDIOLOGY | Facility: CLINIC | Age: 74
End: 2024-04-04
Payer: MEDICARE

## 2024-04-04 VITALS — HEART RATE: 73 BPM | DIASTOLIC BLOOD PRESSURE: 80 MMHG | SYSTOLIC BLOOD PRESSURE: 132 MMHG

## 2024-04-04 DIAGNOSIS — I25.10 ASHD (ARTERIOSCLEROTIC HEART DISEASE): Primary | ICD-10-CM

## 2024-04-04 PROCEDURE — 93000 ELECTROCARDIOGRAM COMPLETE: CPT | Performed by: INTERNAL MEDICINE

## 2024-04-04 PROCEDURE — 99213 OFFICE O/P EST LOW 20 MIN: CPT | Performed by: INTERNAL MEDICINE

## 2024-04-04 PROCEDURE — 1036F TOBACCO NON-USER: CPT | Performed by: INTERNAL MEDICINE

## 2024-04-04 PROCEDURE — 1159F MED LIST DOCD IN RCRD: CPT | Performed by: INTERNAL MEDICINE

## 2024-04-04 PROCEDURE — 1160F RVW MEDS BY RX/DR IN RCRD: CPT | Performed by: INTERNAL MEDICINE

## 2024-04-04 NOTE — PATIENT INSTRUCTIONS
Continue all current medications as prescribed.   Followup with Dr. Hernandez in 1 year, sooner should any issues or concerns arise before then.     If you have any questions or cardiac concerns, please call our office at [phone].

## 2024-04-04 NOTE — LETTER
April 4, 2024     Katelin Benson, [facility]ANDA-[facility]  [address]  [facility] [address]    Patient: Joe Pathak   YOB: 1950   Date of Visit: 4/4/2024       Dear MARANDA Sampson-MARIANNA:    Thank you for referring Joe Pathak to me for evaluation. Below are my notes for this consultation.  If you have questions, please do not hesitate to call me. I look forward to following your patient along with you.       Sincerely,     Benji Hernandez MD      CC: No Recipients  ______________________________________________________________________________________    Counseling:  The patient was counseled regarding diagnostic results, instructions for management, risk factor reductions, prognosis, patient and family education, impressions, risks and benefits of treatment options and importance of compliance with treatment.      Chief Complaint:   The patient presents today for overdue annual followup of CAD and palpitations.     History Of Present Illness:    Joe Ptahak is a 73 year old male patient who presents today for overdue annual followup of CAD and palpitations. His PMH is significant for CAD s/p PCI to proximal LAD and proximal mid LAD in 2013, hyperlipidemia and sensorineural hearing loss with cochlear implant. Over the past year, the patient states that he has done well from a cardiac standpoint. He denies any CP, chest discomfort, SOB or palpitations. BP has been stable. EKG today shows NSR with no acute changes. The patient is compliant with his prescribed medications.        Last Recorded Vitals:  Vitals:    04/04/24 1348   BP: 132/80   Pulse: 73       Past Surgical History:  He has a past surgical history that includes Coronary angioplasty (09/06/2016); Tonsillectomy (09/12/2016); and Back surgery (09/12/2016).      Social History:  He reports that he has never smoked. He has never used smokeless tobacco. He reports that he does not drink alcohol  and does not use drugs.    Family History:  No family history on file.     Allergies:  Atorvastatin, Lovastatin, Niacin, Rosuvastatin, and Statins-hmg-coa reductase inhibitors    Outpatient Medications:  Current Outpatient Medications   Medication Instructions   • aspirin 81 mg EC tablet 1 tablet, oral, Daily   • aspirin 81 mg, oral, Every 24 hours   • celecoxib (CELEBREX) 200 mg, oral, 2 times daily   • clopidogrel (PLAVIX) 75 mg, oral, Every 24 hours   • co-enzyme Q-10 (COQ-10) 30 mg, oral, Every 24 hours   • DOCOSAHEXAENOIC ACID ORAL omega 3-dha-epa-fish oil (Fish OiL) 1,000 mg (120 mg-180 mg) capsule 1 capsule (1,000 mg) once every 24 hours. 0 Active   • fish,bora,flax oils-om3,6,9no1 (Omega 3-6-9 Complex) 400-400-400 mg capsule 1 capsule, oral, Daily   • fluocinolone (DermOtic) 0.01 % ear drops INSTILL 5 DROPS INTO AFFECTED EAR(S) BY OTIC ROUTE QD PRN ITCHNG   • metoprolol tartrate (Lopressor) 25 mg tablet 0.5 tablets, oral, 2 times daily   • multivitamin with folic acid (One Daily Multivitamin) 400 mcg tablet 1 tablet, oral, Daily   • omega 3-dha-epa-fish oil (Fish OiL) 1,000 mg (120 mg-180 mg) capsule 1 capsule, Every 24 hours   • Repatha SureClick 140 mg, subcutaneous, Every 14 days     Review of Systems   All other systems reviewed and are negative.     Physical Exam:  Constitutional:       Appearance: Healthy appearance. Not in distress.   Neck:      Vascular: No JVR. JVD normal.   Pulmonary:      Effort: Pulmonary effort is normal.      Breath sounds: Normal breath sounds. No wheezing. No rhonchi. No rales.   Chest:      Chest wall: Not tender to palpatation.   Cardiovascular:      PMI at left midclavicular line. Normal rate. Regular rhythm. Normal S1. Normal S2.       Murmurs: There is no murmur.      No gallop.  No click. No rub.   Pulses:     Intact distal pulses.   Edema:     Peripheral edema absent.   Abdominal:      General: Bowel sounds are normal.      Palpations: Abdomen is soft.      Tenderness:  There is no abdominal tenderness.   Musculoskeletal: Normal range of motion.         General: No tenderness. Skin:     General: Skin is warm and dry.   Neurological:      General: No focal deficit present.      Mental Status: Alert and oriented to person, place and time.          Last Labs:  CBC -  Lab Results   Component Value Date    WBC 4.6 02/02/2024    HGB 14.9 02/02/2024    HCT 46.8 02/02/2024    MCV 98 02/02/2024     02/02/2024       CMP -  Lab Results   Component Value Date    CALCIUM 9.1 02/02/2024    PROT 7.4 02/02/2024    ALBUMIN 4.5 02/02/2024    AST 25 02/02/2024    ALT 23 02/02/2024    ALKPHOS 43 02/02/2024    BILITOT 0.7 02/02/2024       LIPID PANEL -   Lab Results   Component Value Date    CHOL 136 02/02/2024    TRIG 76 02/02/2024    HDL 54.6 02/02/2024    CHHDL 2.5 02/02/2024    LDLF 63 01/04/2023    VLDL 15 02/02/2024    NHDL 81 02/02/2024       RENAL FUNCTION PANEL -   Lab Results   Component Value Date    GLUCOSE 96 02/02/2024     02/02/2024    K 5.1 02/02/2024     02/02/2024    CO2 25 02/02/2024    ANIONGAP 13 02/02/2024    BUN 22 02/02/2024    CREATININE 0.92 02/02/2024    GFRMALE 86 01/04/2023    CALCIUM 9.1 02/02/2024    ALBUMIN 4.5 02/02/2024        Last Cardiology Tests:  12/12/2023 - Exercise Stress Echo  1. No clinical or electrocardiographic evidence for ischemia at maximal workload.  2. Adequate level of stress achieved.  3. Normal global left ventricular systolic function.  4. The resting ejection fraction was estimated at 55 to 60% with a peak exercise ejection fraction estimated at 60 to 65%.  5. There were no stress-induced wall motion abnormalities. This is a negative stress echo test for ischemia.    11/22/2023 to 12/06/2023 - Holter Monitor  1. Predominant underlying rhythm was sinus rhythm; min HR 45 bpm, max  bpm, avg HR 72 bpm.  2. 7 supraventricular tachycardia runs occurred; fastest interval lasting 5 beats with max rate 174 bpm, longest lasting 17 b  eats with avg rate 125 bpm.  3. Isolated SVEs were rare, SVE couplets were rare, and SVE triplets were rare.  4. Isolated VEs were rare, no VE couplets were present, and VE triplets were rare.    01/22/2013 - Cardiac Catheterization  1. Severe two vessel coronary artery disease.  2. Successful stenting of the 70% stenosis in the proximal left anterior descending artery.  3. Successful angioplasty and successful stenting of the 99% stenosis in the proximal to mid left anterior descending artery.  4. 60% stenosis in the distal right coronary artery.  5. The left ventricular ejection fraction was 60%.  6. Normal left ventricular size and contractility.     Lab review: I have personally reviewed the laboratory result(s).    Assessment/Plan  1) CAD s/p PCI in Past  On ASA 81 mg daily, Plavix 75 mg daily, Repatha 140 mg/mL every 2 weeks, metoprolol tartrate 12.5 mg BID  Exercise stress echo 12/12/2023 negative for ischemia   Lipid panel 02/02/2024 with LDL of 66  Denies CP, chest discomfort or SOB  BP stable  EKG stable  F/U 1 year     2) Palpitations  On metoprolol tartrate 12.5 mg BID  Holter 11/22/2023 to 12/06/2023 with 7 runs of SVT  Denies palpitations currently   F/U 1 year       Scribe Attestation  By signing my name below, IMary Grace Scribe   attest that this documentation has been prepared under the direction and in the presence of Benji Hernandez MD.

## 2024-05-16 ENCOUNTER — TELEPHONE (OUTPATIENT)
Dept: PRIMARY CARE | Facility: CLINIC | Age: 74
End: 2024-05-16
Payer: MEDICARE

## 2024-05-16 DIAGNOSIS — M54.50 LOW BACK PAIN, UNSPECIFIED BACK PAIN LATERALITY, UNSPECIFIED CHRONICITY, UNSPECIFIED WHETHER SCIATICA PRESENT: ICD-10-CM

## 2024-05-16 DIAGNOSIS — I25.10 ASHD (ARTERIOSCLEROTIC HEART DISEASE): Primary | ICD-10-CM

## 2024-05-16 RX ORDER — CELECOXIB 200 MG/1
200 CAPSULE ORAL 2 TIMES DAILY
Qty: 180 CAPSULE | Refills: 3 | Status: SHIPPED | OUTPATIENT
Start: 2024-05-16 | End: 2025-05-11

## 2024-05-16 NOTE — TELEPHONE ENCOUNTER
Rx Refill Request Telephone Encounter    Name:  Joe Willisgilmermegha  :  [id number]  Medication Name:  Celebrex  200 mg        Take 1 capsule by mouth 2 times a day  Specific Pharmacy location:  [facility]  Date of last appointment:  2023

## 2024-05-17 RX ORDER — METOPROLOL TARTRATE 25 MG/1
12.5 TABLET, FILM COATED ORAL 2 TIMES DAILY
Qty: 90 TABLET | Refills: 3 | Status: SHIPPED | OUTPATIENT
Start: 2024-05-17

## 2024-05-17 NOTE — TELEPHONE ENCOUNTER
- Notified patient that we do not fill his Celebrex and he would need to call his PCP to get that refilled. Patient understood and stated he would call the PCP.   - Christina Barakat      ----- Message from Machelle Cornejo RN sent at 5/16/2024  3:30 PM EDT -----  Regarding: FW: Perscription Refill  Call and tell him to ask his PCP our office does not prescribe celebrex.  ----- Message -----  From: Xavi Mccray  Sent: 5/16/2024   2:16 PM EDT  To: Do [id number] Clinical Support Staff  Subject: Perscription Refill                              Patient came in for refill of celecoxib (CeleBREX) 200 mg capsule.  Please send to [facility] - [place], [place] - [address] [address] Phone: [phone]  Fax: [phone]

## 2024-06-11 ENCOUNTER — TELEPHONE (OUTPATIENT)
Dept: CARDIOLOGY | Facility: CLINIC | Age: 74
End: 2024-06-11
Payer: MEDICARE

## 2024-06-11 DIAGNOSIS — I25.10 ASHD (ARTERIOSCLEROTIC HEART DISEASE): ICD-10-CM

## 2024-06-11 DIAGNOSIS — E78.2 MIXED HYPERLIPIDEMIA: ICD-10-CM

## 2024-06-11 RX ORDER — EVOLOCUMAB 140 MG/ML
140 INJECTION, SOLUTION SUBCUTANEOUS
Qty: 6 ML | Refills: 3 | Status: SHIPPED | OUTPATIENT
Start: 2024-06-11

## 2024-06-11 NOTE — TELEPHONE ENCOUNTER
6/11/24  1522  Called and when asked, informed patient that Repatha sent for approval today and informed wife of patient if needed readjusted for days supply to inform nurse.    Both patient and wife of patient verbalized understanding.          ----- Message from Malini Ness sent at 6/11/2024  1:11 PM EDT -----  Regarding: Med Questions  Hi, Mr Bailey stopped by asking to speak with someone from Dr Hernandez's team regarding his Repatha. I let him know someone would give him a call. It looks like it was discontinued back in January. He could be asking for a replacement.

## 2024-06-26 ENCOUNTER — APPOINTMENT (OUTPATIENT)
Dept: PRIMARY CARE | Facility: CLINIC | Age: 74
End: 2024-06-26
Payer: MEDICARE

## 2024-06-26 VITALS
DIASTOLIC BLOOD PRESSURE: 60 MMHG | BODY MASS INDEX: 29.25 KG/M2 | HEART RATE: 65 BPM | SYSTOLIC BLOOD PRESSURE: 122 MMHG | HEIGHT: 66 IN | WEIGHT: 182 LBS

## 2024-06-26 DIAGNOSIS — Z96.21 COCHLEAR IMPLANT IN PLACE: ICD-10-CM

## 2024-06-26 DIAGNOSIS — E55.9 VITAMIN D DEFICIENCY: ICD-10-CM

## 2024-06-26 DIAGNOSIS — I25.10 ASHD (ARTERIOSCLEROTIC HEART DISEASE): ICD-10-CM

## 2024-06-26 DIAGNOSIS — M54.50 LOW BACK PAIN, UNSPECIFIED BACK PAIN LATERALITY, UNSPECIFIED CHRONICITY, UNSPECIFIED WHETHER SCIATICA PRESENT: ICD-10-CM

## 2024-06-26 DIAGNOSIS — Z12.11 COLON CANCER SCREENING: Primary | ICD-10-CM

## 2024-06-26 DIAGNOSIS — E78.2 MIXED HYPERLIPIDEMIA: ICD-10-CM

## 2024-06-26 DIAGNOSIS — M79.606 PAIN OF LOWER EXTREMITY, UNSPECIFIED LATERALITY: ICD-10-CM

## 2024-06-26 PROBLEM — J44.9 CHRONIC OBSTRUCTIVE PULMONARY DISEASE (MULTI): Status: RESOLVED | Noted: 2024-03-29 | Resolved: 2024-06-26

## 2024-06-26 PROCEDURE — 1036F TOBACCO NON-USER: CPT | Performed by: CLINICAL NURSE SPECIALIST

## 2024-06-26 PROCEDURE — 1160F RVW MEDS BY RX/DR IN RCRD: CPT | Performed by: CLINICAL NURSE SPECIALIST

## 2024-06-26 PROCEDURE — 1159F MED LIST DOCD IN RCRD: CPT | Performed by: CLINICAL NURSE SPECIALIST

## 2024-06-26 PROCEDURE — 99214 OFFICE O/P EST MOD 30 MIN: CPT | Performed by: CLINICAL NURSE SPECIALIST

## 2024-06-26 ASSESSMENT — ENCOUNTER SYMPTOMS
ACTIVITY CHANGE: 0
CHILLS: 0
PALPITATIONS: 0
HEADACHES: 0
CHEST TIGHTNESS: 0
FEVER: 0
BACK PAIN: 0
FATIGUE: 0
FLANK PAIN: 0
MYALGIAS: 0
CONSTIPATION: 0
POLYDIPSIA: 0
JOINT SWELLING: 0
WOUND: 0
CONFUSION: 0
ARTHRALGIAS: 0
DIZZINESS: 0
DEPRESSION: 0
PHOTOPHOBIA: 0
HEMATURIA: 0
UNEXPECTED WEIGHT CHANGE: 0
LOSS OF SENSATION IN FEET: 0
NECK PAIN: 0
NAUSEA: 0
OCCASIONAL FEELINGS OF UNSTEADINESS: 0
APPETITE CHANGE: 0
VOMITING: 0
BRUISES/BLEEDS EASILY: 0
BLOOD IN STOOL: 0
COUGH: 0
SHORTNESS OF BREATH: 0
TROUBLE SWALLOWING: 0
SORE THROAT: 0
DYSURIA: 0
EYE PAIN: 0
DIARRHEA: 0
ABDOMINAL PAIN: 0
SEIZURES: 0
SLEEP DISTURBANCE: 0
WHEEZING: 0

## 2024-06-26 ASSESSMENT — PATIENT HEALTH QUESTIONNAIRE - PHQ9
1. LITTLE INTEREST OR PLEASURE IN DOING THINGS: NOT AT ALL
SUM OF ALL RESPONSES TO PHQ9 QUESTIONS 1 AND 2: 0
2. FEELING DOWN, DEPRESSED OR HOPELESS: NOT AT ALL

## 2024-06-26 ASSESSMENT — COLUMBIA-SUICIDE SEVERITY RATING SCALE - C-SSRS
2. HAVE YOU ACTUALLY HAD ANY THOUGHTS OF KILLING YOURSELF?: NO
1. IN THE PAST MONTH, HAVE YOU WISHED YOU WERE DEAD OR WISHED YOU COULD GO TO SLEEP AND NOT WAKE UP?: NO
6. HAVE YOU EVER DONE ANYTHING, STARTED TO DO ANYTHING, OR PREPARED TO DO ANYTHING TO END YOUR LIFE?: NO

## 2024-06-26 NOTE — PROGRESS NOTES
Subjective   Patient ID: Joe Pathak is a 73 y.o. male who presents for Follow-up (Follow up).  HPI    Here today as a follow up appointment.      Follows with Dr. Hernandez for Cardiology. CAD PCI in the past. Has been stable on medication, intolerant to Statins. No changes made at last OV in April 2024, following annually.      Patient states that he has developed lower extremity pain. Extending from the buttocks down his legs. Has taken OTC pain medication with some improvement. Worse when trying to sleep at night. Patient states that when he is up in moving will help with his symptoms. No improvement with Gabapentin. Completed PT with minimal improvement. Flexeril PRN. Has noticed improvement with Celebrex. MRI completed.      Was having complaints of heart palpitations and fluttering. Stress/ECHO and Holter completed. States that after the Holter was placed no further issues/concerns. Has been feeling well.     Review of Systems   Constitutional:  Negative for activity change, appetite change, chills, fatigue, fever and unexpected weight change.   HENT:  Negative for ear pain, hearing loss, nosebleeds, sore throat, tinnitus and trouble swallowing.    Eyes:  Negative for photophobia, pain and visual disturbance.   Respiratory:  Negative for cough, chest tightness, shortness of breath and wheezing.    Cardiovascular:  Negative for chest pain, palpitations and leg swelling.   Gastrointestinal:  Negative for abdominal pain, blood in stool, constipation, diarrhea, nausea and vomiting.   Endocrine: Negative for cold intolerance, heat intolerance, polydipsia and polyuria.   Genitourinary:  Negative for dysuria, flank pain and hematuria.   Musculoskeletal:  Negative for arthralgias, back pain, joint swelling, myalgias and neck pain.   Skin:  Negative for pallor, rash and wound.   Allergic/Immunologic: Negative for immunocompromised state.   Neurological:  Negative for dizziness, seizures and headaches.    Hematological:  Does not bruise/bleed easily.   Psychiatric/Behavioral:  Negative for confusion and sleep disturbance.        Objective   Physical Exam  Vitals and nursing note reviewed.   Constitutional:       General: He is not in acute distress.     Appearance: Normal appearance.   HENT:      Head: Normocephalic.      Nose: Nose normal.   Eyes:      Conjunctiva/sclera: Conjunctivae normal.   Neck:      Vascular: No carotid bruit.   Cardiovascular:      Rate and Rhythm: Normal rate and regular rhythm.      Pulses: Normal pulses.      Heart sounds: Normal heart sounds.   Pulmonary:      Effort: Pulmonary effort is normal.      Breath sounds: Normal breath sounds.   Abdominal:      General: Bowel sounds are normal.      Palpations: Abdomen is soft.   Musculoskeletal:         General: Normal range of motion.      Cervical back: Normal range of motion.   Skin:     General: Skin is warm and dry.   Neurological:      Mental Status: He is alert and oriented to person, place, and time. Mental status is at baseline.   Psychiatric:         Mood and Affect: Mood normal.         Behavior: Behavior normal.       Assessment/Plan        New order for blood work provided at  today.      Coronary artery disease with history of stent placement 2013. Currently asymptomatic. He continues on aspirin and beta-blocker. He has been statin intolerant. Blood pressure well controlled. He has follow-up with cardiology scheduled.   Dyslipidemia. History of statin myopathy. Currently doing well on Praluent since 2020.   Hearing loss status post cochlear implant. Following with Specialist.   Lower Extremity Pain, Back Pain: Discontinued Gabapentin without relief. Flexeril PRN. MRI completed. Continue Celebrex.      Colonoscopy August 2014 normal, repeat in 10 years. Ordered for 2024.   PSA: February 2024.   COVID Vaccine: December 2023.   Medicare Wellness: December 2023.   Pneumovax: April 2021.   Flu Vaccine: Fall 2023.   RSV Vaccine:  December 2023.   Discussed Prevnar, Shingrix, and Tdap.        Katelin Benson, APRN-CNS 06/26/24 7:25 AM

## 2024-07-08 ENCOUNTER — TELEPHONE (OUTPATIENT)
Dept: GASTROENTEROLOGY | Facility: CLINIC | Age: 74
End: 2024-07-08
Payer: MEDICARE

## 2024-07-08 NOTE — TELEPHONE ENCOUNTER
----- Message from David Rodriguez sent at 7/8/2024  8:06 AM EDT -----  Regarding: Office Visit  Patient scheduled for 9/16/24 with Rossy Lopez  ----- Message -----  From: David Rodriguez  Sent: 7/5/2024   1:04 PM EDT  To: David Rodriguez; #  Subject: 2nd Message left for patient                       ----- Message -----  From: David Rodriguez  Sent: 7/2/2024   1:27 PM EDT  To: David Rodriguez  Subject: Left a message forpatient                        To schedule NP OV  ----- Message -----  From: Maricarmen Lala MA  Sent: 7/1/2024   2:25 PM EDT  To: Do [id number] Clerical  Subject: OFFICE VISIT NEEDED                                ----- Message -----  From: Maricarmen Lala MA  Sent: 7/1/2024   2:25 PM EDT  To: Do [id number] [facility]astro[facility] Clerical  Subject: RE: Open Access                                    ----- Message -----  From: Kristina Mares RN  Sent: 7/1/2024   2:23 PM EDT  To: Maricarmen Lala MA  Subject: RE: Open Access                                  CALLED PATIENT TO VERIFY IF HE IS TAKING PLAVIX, HE IS STILL TAKING PLAVIX AND SHOULD BE AN OFFICE VISIT. OA FORM RE-SCANNED   ----- Message -----  From: Maricarmen Lala MA  Sent: 6/27/2024   3:04 PM EDT  To: Kristina Mares RN; Monique Jeronimo RN; #  Subject: RE: Open Access                                  PATIENT'S MED LIST HAS PLAVIX LISTED. CAN YOU PLEASE VERIFY IF HE IS TAKING IT? VISIT WITH DR. BAL IN APRIL STATES THAT PATIENT IS CONTINUE. THANK YOU!  ----- Message -----  From: Kristina Mares RN  Sent: 6/27/2024   1:19 PM EDT  To: Do [id number] [facility]astro[facility] Clerical  Subject: Open Access                                      Open Access

## 2024-09-16 ENCOUNTER — APPOINTMENT (OUTPATIENT)
Dept: GASTROENTEROLOGY | Facility: CLINIC | Age: 74
End: 2024-09-16
Payer: MEDICARE

## 2024-09-16 VITALS
DIASTOLIC BLOOD PRESSURE: 67 MMHG | BODY MASS INDEX: 29.09 KG/M2 | OXYGEN SATURATION: 94 % | WEIGHT: 181 LBS | HEIGHT: 66 IN | SYSTOLIC BLOOD PRESSURE: 134 MMHG | HEART RATE: 59 BPM

## 2024-09-16 DIAGNOSIS — Z12.11 COLON CANCER SCREENING: Primary | ICD-10-CM

## 2024-09-16 PROCEDURE — 1159F MED LIST DOCD IN RCRD: CPT | Performed by: NURSE PRACTITIONER

## 2024-09-16 PROCEDURE — 99204 OFFICE O/P NEW MOD 45 MIN: CPT | Performed by: NURSE PRACTITIONER

## 2024-09-16 RX ORDER — POLYETHYLENE GLYCOL 3350, SODIUM SULFATE ANHYDROUS, SODIUM BICARBONATE, SODIUM CHLORIDE, POTASSIUM CHLORIDE 236; 22.74; 6.74; 5.86; 2.97 G/4L; G/4L; G/4L; G/4L; G/4L
4000 POWDER, FOR SOLUTION ORAL ONCE
Qty: 4000 ML | Refills: 0 | Status: SHIPPED | OUTPATIENT
Start: 2024-09-16 | End: 2024-09-16

## 2024-09-16 ASSESSMENT — ENCOUNTER SYMPTOMS
DIZZINESS: 0
TROUBLE SWALLOWING: 0
PALPITATIONS: 0
SHORTNESS OF BREATH: 0
DIFFICULTY URINATING: 0
FEVER: 0
BRUISES/BLEEDS EASILY: 0
WOUND: 0
CONFUSION: 0
ROS GI COMMENTS: SEE HPI
ADENOPATHY: 0
ARTHRALGIAS: 0
SORE THROAT: 0
JOINT SWELLING: 0
COUGH: 0
WEAKNESS: 0
CHILLS: 0

## 2024-09-16 NOTE — PROGRESS NOTES
Subjective   Patient ID: Joe Pathak is a 74 y.o. male with PMH of arthritis, DVT, CAD, cardiac stent (2013), HLD, and cochlear implant who was referred by PCP for No chief complaint on file..     Patient's PCP is MARANDA Abraham-CNS     HPI  OA fail -- plavix     Patient follows with cardiology (Dr. Hernandez) for history of CAD with LAD stent 2013. He is on Plavix. He last saw cardiology in April 2024, and was doing well at that time with instructions to follow up with cardiology in 1 years; no changes made. Most recent stress echo 2023 was negative for ischemia and EF 55-60%.     Patient is not having any GI symptoms. Patient denies any unintended weight loss, nausea, vomiting, dysphagia, reflux, abdominal pain, melena, hematemesis, diarrhea, constipation, or rectal bleeding.      Summary of endoscopies:  - Colonoscopy 8/2014: normal colon     Social Hx:  Tobacco: none  Etoh: none  Recreational drug use: none  NSAIDs: none      Family Hx:  No GI malignancy, IBD, or pancreatitis     Review of Systems:  Review of Systems   Constitutional:  Negative for chills and fever.   HENT:  Negative for sore throat and trouble swallowing.    Respiratory:  Negative for cough and shortness of breath.    Cardiovascular:  Negative for chest pain and palpitations.   Gastrointestinal:         SEE HPI   Endocrine: Negative for cold intolerance and heat intolerance.   Genitourinary:  Negative for difficulty urinating.   Musculoskeletal:  Negative for arthralgias and joint swelling.   Skin:  Negative for rash and wound.   Neurological:  Negative for dizziness and weakness.   Hematological:  Negative for adenopathy. Does not bruise/bleed easily.   Psychiatric/Behavioral:  Negative for confusion.         Medications:  Prior to Admission medications    Medication Sig Start Date End Date Taking? Authorizing Provider   aspirin 81 mg chewable tablet Chew 1 tablet (81 mg) once every 24 hours.    Historical Provider, MD   celecoxib  (CeleBREX) 200 mg capsule Take 1 capsule (200 mg) by mouth 2 times a day. 5/16/24 5/11/25  MARANDA Abraham-CNS   clopidogrel (Plavix) 75 mg tablet Take 1 tablet (75 mg) by mouth once every 24 hours.    Historical Provider, MD   co-enzyme Q-10 (CoQ-10) 30 mg capsule Take 1 capsule (30 mg) by mouth once every 24 hours.    Historical Provider, MD   DOCOSAHEXAENOIC ACID ORAL omega 3-dha-epa-fish oil (Fish OiL) 1,000 mg (120 mg-180 mg) capsule 1 capsule (1,000 mg) once every 24 hours. 0 Active    Historical Provider, MD   evolocumab (Repatha SureClick) 140 mg/mL injection Inject 1 mL (140 mg) under the skin every 14 (fourteen) days. 6/11/24   Marty Andino PA-C   metoprolol tartrate (Lopressor) 25 mg tablet Take 0.5 tablets (12.5 mg) by mouth 2 times a day. 5/17/24   MARANDA Allred-CNP   multivitamin with folic acid (One Daily Multivitamin) 400 mcg tablet Take 1 tablet by mouth once daily. 9/12/16   Historical Provider, MD       Allergies:  Atorvastatin, Lovastatin, Niacin, Rosuvastatin, and Statins-hmg-coa reductase inhibitors    Past Medical History:  He has a past medical history of Abnormal result of other cardiovascular function study, Otitis externa (06/28/2023), Personal history of other diseases of the musculoskeletal system and connective tissue, Personal history of other venous thrombosis and embolism, Subacute otitis media (06/28/2023), and Unspecified otitis externa, unspecified ear (03/12/2021).    Past Surgical History:  He has a past surgical history that includes Coronary angioplasty (09/06/2016); Tonsillectomy (09/12/2016); and Back surgery (09/12/2016).    Social History:  He reports that he has never smoked. He has never used smokeless tobacco. He reports that he does not drink alcohol and does not use drugs.    Objective   Physical exam:  Physical Exam  Constitutional:       General: He is not in acute distress.     Appearance: Normal appearance.   HENT:      Mouth/Throat:      Mouth:  Mucous membranes are moist.      Comments: pink  Eyes:      Conjunctiva/sclera: Conjunctivae normal.      Pupils: Pupils are equal, round, and reactive to light.   Cardiovascular:      Rate and Rhythm: Normal rate and regular rhythm.      Heart sounds: No murmur heard.  Pulmonary:      Effort: Pulmonary effort is normal.      Breath sounds: Normal breath sounds.   Abdominal:      General: Bowel sounds are normal. There is no distension.      Palpations: Abdomen is soft.      Tenderness: There is no abdominal tenderness. There is no guarding.   Skin:     General: Skin is warm and dry.      Coloration: Skin is not jaundiced.   Neurological:      Mental Status: He is alert and oriented to person, place, and time.   Psychiatric:         Mood and Affect: Mood normal.         Behavior: Behavior normal.          Assessment/Plan     Screening colon cancer   Last colonoscopy 10 years ago with no polyps. No current symptoms. Will order to be done in the endoscopy center.     Meds to hold: plavix x5 days          Ginna Lopez, MARANDA-CNP

## 2024-09-16 NOTE — PATIENT INSTRUCTIONS
Thank you for coming to your appointment today   - You will be scheduled for a colonoscopy in the endoscopy center   - HOLD YOUR PLAVIX (CLOPIDOGREL) FOR 5 DAYS PRIOR TO YOUR PROCEDURE. You can keep taking the aspirin 81mg daily.   - Please follow the bowel prep instructions given to you by the office.   - After your procedure, you can expect to speak to the physician to go over the initial results of the procedure.   - If any polyps are removed during your procedure or if any biopsies are obtained those specimens will go to the pathologists to review under the microscope. Once those results are available they will be sent to the physician electronically to review. These results will also be available to you at that time through the patient portal. These results will be reviewed by the physician and communicated back to you with final recommendations. If you have questions or need additional information I urge you to call the office at [phone], but we do ask for patience as the we are often with patients.   - You were also given information regarding the schedule for your procedure including the time that you need to arrive to the endoscopy unit.  You will also be contacted about 1 week prior to your procedure to confirm the final arrival time.  If you have questions about this or if you need to cancel or change this appointment please call my office at [phone].      Please call [phone] with any questions or concerns

## 2024-10-06 ENCOUNTER — PREP FOR PROCEDURE (OUTPATIENT)
Dept: GASTROENTEROLOGY | Facility: CLINIC | Age: 74
End: 2024-10-06
Payer: MEDICARE

## 2024-10-06 RX ORDER — SODIUM CHLORIDE 9 MG/ML
20 INJECTION, SOLUTION INTRAVENOUS CONTINUOUS
Status: CANCELLED | OUTPATIENT
Start: 2024-10-06

## 2024-10-08 ENCOUNTER — HOSPITAL ENCOUNTER (OUTPATIENT)
Dept: GASTROENTEROLOGY | Facility: HOSPITAL | Age: 74
Discharge: HOME | End: 2024-10-08
Payer: MEDICARE

## 2024-10-08 ENCOUNTER — ANESTHESIA EVENT (OUTPATIENT)
Dept: GASTROENTEROLOGY | Facility: HOSPITAL | Age: 74
End: 2024-10-08
Payer: MEDICARE

## 2024-10-08 ENCOUNTER — ANESTHESIA (OUTPATIENT)
Dept: GASTROENTEROLOGY | Facility: HOSPITAL | Age: 74
End: 2024-10-08
Payer: MEDICARE

## 2024-10-08 VITALS
HEIGHT: 66 IN | HEART RATE: 60 BPM | TEMPERATURE: 98.1 F | RESPIRATION RATE: 16 BRPM | WEIGHT: 181 LBS | DIASTOLIC BLOOD PRESSURE: 72 MMHG | SYSTOLIC BLOOD PRESSURE: 118 MMHG | BODY MASS INDEX: 29.09 KG/M2 | OXYGEN SATURATION: 97 %

## 2024-10-08 DIAGNOSIS — Z12.11 COLON CANCER SCREENING: ICD-10-CM

## 2024-10-08 PROCEDURE — 7100000010 HC PHASE TWO TIME - EACH INCREMENTAL 1 MINUTE

## 2024-10-08 PROCEDURE — 3700000002 HC GENERAL ANESTHESIA TIME - EACH INCREMENTAL 1 MINUTE

## 2024-10-08 PROCEDURE — 2500000004 HC RX 250 GENERAL PHARMACY W/ HCPCS (ALT 636 FOR OP/ED): Performed by: INTERNAL MEDICINE

## 2024-10-08 PROCEDURE — 3700000001 HC GENERAL ANESTHESIA TIME - INITIAL BASE CHARGE

## 2024-10-08 PROCEDURE — 7100000009 HC PHASE TWO TIME - INITIAL BASE CHARGE

## 2024-10-08 PROCEDURE — 45380 COLONOSCOPY AND BIOPSY: CPT | Performed by: INTERNAL MEDICINE

## 2024-10-08 PROCEDURE — 2500000004 HC RX 250 GENERAL PHARMACY W/ HCPCS (ALT 636 FOR OP/ED): Performed by: NURSE ANESTHETIST, CERTIFIED REGISTERED

## 2024-10-08 RX ORDER — SODIUM CHLORIDE 9 MG/ML
20 INJECTION, SOLUTION INTRAVENOUS CONTINUOUS
Status: DISCONTINUED | OUTPATIENT
Start: 2024-10-08 | End: 2024-10-09 | Stop reason: HOSPADM

## 2024-10-08 RX ORDER — LIDOCAINE HCL/PF 100 MG/5ML
SYRINGE (ML) INTRAVENOUS AS NEEDED
Status: DISCONTINUED | OUTPATIENT
Start: 2024-10-08 | End: 2024-10-08

## 2024-10-08 RX ORDER — PROPOFOL 10 MG/ML
INJECTION, EMULSION INTRAVENOUS AS NEEDED
Status: DISCONTINUED | OUTPATIENT
Start: 2024-10-08 | End: 2024-10-08

## 2024-10-08 SDOH — HEALTH STABILITY: MENTAL HEALTH: CURRENT SMOKER: 0

## 2024-10-08 ASSESSMENT — PAIN - FUNCTIONAL ASSESSMENT
PAIN_FUNCTIONAL_ASSESSMENT: 0-10

## 2024-10-08 ASSESSMENT — PAIN SCALES - GENERAL
PAINLEVEL_OUTOF10: 0 - NO PAIN

## 2024-10-08 NOTE — ANESTHESIA PREPROCEDURE EVALUATION
Patient: Joe Pathak    Procedure Information       Date/Time: 10/08/24 0800    Scheduled providers: Chris Cordero MD    Procedure: COLONOSCOPY    Location: [facility]            Relevant Problems   Cardiac   (+) ASHD (arteriosclerotic heart disease)   (+) Hyperlipidemia      HEENT   (+) Sensorineural hearing loss, bilateral      Skin   (+) Eczema of external auditory canal       Clinical information reviewed:   Tobacco  Allergies  Meds   Med Hx  Surg Hx   Fam Hx  Soc Hx        NPO Detail:  NPO/Void Status  Carbohydrate Drink Given Prior to Surgery? : N  Date of Last Liquid: 10/08/24  Time of Last Liquid: 0330  Date of Last Solid: 10/06/24  Time of Last Solid: 1800  Last Intake Type: Clear fluids  Time of Last Void: 0717         Physical Exam    Airway  Mallampati: II     Cardiovascular - normal exam     Dental    Pulmonary - normal exam     Abdominal        Anesthesia Plan    History of general anesthesia?: yes  History of complications of general anesthesia?: no    ASA 3     MAC     The patient is not a current smoker.    Anesthetic plan and risks discussed with patient.  Use of blood products discussed with who consented to blood products.

## 2024-10-08 NOTE — ANESTHESIA POSTPROCEDURE EVALUATION
Patient: Joe Pathak    Procedure Summary       Date: 10/08/24 Room / Location: [facility]    Anesthesia Start: 0801 Anesthesia Stop: 0832    Procedure: COLONOSCOPY Diagnosis: Colon cancer screening    Scheduled Providers: Chris Cordero MD Responsible Provider: MOE Jaime    Anesthesia Type: MAC ASA Status: 3            Anesthesia Type: MAC    Vitals Value Taken Time   /72 10/08/24 0905   Temp 36.7 °C (98.1 °F) 10/08/24 0849   Pulse 67 10/08/24 0905   Resp 13 10/08/24 0905   SpO2 94 % 10/08/24 0849       Anesthesia Post Evaluation    Patient location during evaluation: bedside  Patient participation: complete - patient participated  Level of consciousness: awake  Pain management: adequate  Airway patency: patent  Cardiovascular status: acceptable  Respiratory status: acceptable  Hydration status: acceptable  Postoperative Nausea and Vomiting: none    There were no known notable events for this encounter.

## 2024-10-09 NOTE — ADDENDUM NOTE
Encounter addended by: Laura Farris RN on: 10/9/2024 1:33 PM   Actions taken: Contacts section saved, Flowsheet accepted

## 2024-10-10 ENCOUNTER — TELEPHONE (OUTPATIENT)
Dept: CARDIOLOGY | Facility: HOSPITAL | Age: 74
End: 2024-10-10
Payer: MEDICARE

## 2024-10-10 NOTE — TELEPHONE ENCOUNTER
Pt came into the office today regarding his Plavix, pt states he has been off of it for two years due to costs. RN notified Dr. Hernandez and he does not want to reorder at this time due to being off of it for 2 years. Pt notified.

## 2024-10-14 LAB
LABORATORY COMMENT REPORT: NORMAL
PATH REPORT.ADDENDUM SPEC: NORMAL
PATH REPORT.FINAL DX SPEC: NORMAL
PATH REPORT.GROSS SPEC: NORMAL
PATH REPORT.RELEVANT HX SPEC: NORMAL
PATH REPORT.TOTAL CANCER: NORMAL

## 2024-12-28 NOTE — PROGRESS NOTES
Subjective   Reason for Visit: Joe Pathak is an 74 y.o. male here for a Medicare Wellness visit.               HPI    Here today as a follow up appointment. Due for Medicare Wellness.      Follows with Dr. Hernandez for Cardiology. CAD PCI in the past. Has been stable on medication, intolerant to Statins. No changes made at last OV in April 2024, following annually.      Patient states that he has developed lower extremity pain. Extending from the buttocks down his legs. Has taken OTC pain medication with some improvement. Worse when trying to sleep at night. Patient states that when he is up in moving will help with his symptoms. No improvement with Gabapentin. Completed PT with minimal improvement. Flexeril PRN. Has noticed improvement with Celebrex. MRI completed.      Was having complaints of heart palpitations and fluttering. Stress/ECHO and Holter completed. States that after the Holter was placed no further issues/concerns. Has been feeling well.     Patient Care Team:  LOURDES Abraham as PCP - General (Internal Medicine)  [facility] Abraham [facility] PCP [facility]ributed Provider  Benji Hernandez MD as Consulting Physician (Cardiology)  Chris Cordero MD as Consulting Physician (Gastroenterology)  Dannie Guzmán DO as Consulting Physician (Cardiology)     Review of Systems   Constitutional:  Negative for activity change, appetite change, chills, fatigue, fever and unexpected weight change.   HENT:  Positive for hearing loss. Negative for ear pain, nosebleeds, sore throat, tinnitus and trouble swallowing.    Eyes:  Negative for photophobia, pain and visual disturbance.   Respiratory:  Negative for cough, chest tightness, shortness of breath and wheezing.    Cardiovascular:  Negative for chest pain, palpitations and leg swelling.   Gastrointestinal:  Negative for abdominal pain, blood in stool, constipation, diarrhea, nausea and vomiting.   Endocrine: Negative for cold intolerance, heat  "intolerance, polydipsia and polyuria.   Genitourinary:  Negative for dysuria, flank pain and hematuria.   Musculoskeletal:  Negative for arthralgias, back pain, joint swelling, myalgias and neck pain.   Skin:  Negative for pallor, rash and wound.   Allergic/Immunologic: Negative for immunocompromised state.   Neurological:  Negative for dizziness, seizures and headaches.   Hematological:  Does not bruise/bleed easily.   Psychiatric/Behavioral:  Negative for confusion and sleep disturbance.        Objective   Vitals:  /80 (BP Location: Right arm)   Pulse 72   Ht 1.676 m (5' 6\")   Wt 83.9 kg (185 lb)   BMI 29.86 kg/m²       Physical Exam  Vitals and nursing note reviewed.   Constitutional:       General: He is not in acute distress.     Appearance: Normal appearance.   HENT:      Head: Normocephalic.      Nose: Nose normal.   Eyes:      Conjunctiva/sclera: Conjunctivae normal.   Neck:      Vascular: No carotid bruit.   Cardiovascular:      Rate and Rhythm: Normal rate and regular rhythm.      Pulses: Normal pulses.      Heart sounds: Normal heart sounds.   Pulmonary:      Effort: Pulmonary effort is normal.      Breath sounds: Normal breath sounds.   Abdominal:      General: Bowel sounds are normal.      Palpations: Abdomen is soft.   Musculoskeletal:         General: Normal range of motion.      Cervical back: Normal range of motion.   Skin:     General: Skin is warm and dry.   Neurological:      Mental Status: He is alert and oriented to person, place, and time. Mental status is at baseline.   Psychiatric:         Mood and Affect: Mood normal.         Behavior: Behavior normal.         Assessment & Plan  Mixed hyperlipidemia    Orders:    Follow Up In [facility] - PCP - Medicare Annual    Follow Up In [facility] - PCP - Established; Future    ASHD (arteriosclerotic heart disease)    Orders:    Follow Up In [facility] - [facility] - Medicare Annual    Follow Up In [facility] - PCP - [facility]; Future    Cochlear implant in place    Orders:    Follow Up In [facility] - PCP - Medicare Annual    Follow Up In [facility]; Future    Pain of lower extremity, unspecified laterality    Orders:    Follow Up In [facility] - PCP - Medicare Annual    Follow Up In [facility]; Future    Low back pain, unspecified back pain laterality, unspecified chronicity, unspecified whether sciatica present    Orders:    Follow Up In [facility] - PCP - Medicare Annual    Follow Up In [facility]; Future    Medicare annual wellness visit, subsequent    Orders:    Follow Up In [facility]; Future            Has an updated lab work order to have completed.       Coronary artery disease with history of stent placement 2013. Currently asymptomatic. He continues on aspirin and beta-blocker. He has been statin intolerant. Blood pressure well controlled. He has follow-up with cardiology scheduled.   Dyslipidemia. History of statin myopathy. Currently doing well on Praluent since 2020.   Hearing loss status post cochlear implant. Following with Specialist.   Lower Extremity Pain, Back Pain: Discontinued Gabapentin without relief. Flexeril PRN. MRI completed. Continue Celebrex.   Medicare Wellness: Routine and age appropriate recommendations discussed with the patient today and patient verbalized understanding of the recommendations.  Questions answered.  Age appropriate immunizations and preventative screenings discussed with the patient and ordered as appropriate. Labs updated and ordered as indicated. Recommend healthy diet and daily exercise to maintain healthy body weight. 5 minutes  were spent screening for depression using PHQ2/PHQ9 as documented in the chart.      Colonoscopy: October 2024.   PSA: February 2024.   COVID Vaccine: December 2024.   Medicare  Wellness: December 2024.   Pneumovax: April 2021.   Prevnar 20: January 2024.   Flu Vaccine: Fall 2024.   RSV Vaccine: December 2023.   Discussed Prevnar, Shingrix, and Tdap.

## 2024-12-31 ENCOUNTER — APPOINTMENT (OUTPATIENT)
Dept: PRIMARY CARE | Facility: CLINIC | Age: 74
End: 2024-12-31
Payer: MEDICARE

## 2024-12-31 VITALS
DIASTOLIC BLOOD PRESSURE: 80 MMHG | BODY MASS INDEX: 29.73 KG/M2 | WEIGHT: 185 LBS | HEIGHT: 66 IN | SYSTOLIC BLOOD PRESSURE: 130 MMHG | HEART RATE: 72 BPM

## 2024-12-31 DIAGNOSIS — Z00.00 MEDICARE ANNUAL WELLNESS VISIT, SUBSEQUENT: Primary | ICD-10-CM

## 2024-12-31 DIAGNOSIS — E78.2 MIXED HYPERLIPIDEMIA: ICD-10-CM

## 2024-12-31 DIAGNOSIS — M79.606 PAIN OF LOWER EXTREMITY, UNSPECIFIED LATERALITY: ICD-10-CM

## 2024-12-31 DIAGNOSIS — Z96.21 COCHLEAR IMPLANT IN PLACE: ICD-10-CM

## 2024-12-31 DIAGNOSIS — I25.10 ASHD (ARTERIOSCLEROTIC HEART DISEASE): ICD-10-CM

## 2024-12-31 DIAGNOSIS — M54.50 LOW BACK PAIN, UNSPECIFIED BACK PAIN LATERALITY, UNSPECIFIED CHRONICITY, UNSPECIFIED WHETHER SCIATICA PRESENT: ICD-10-CM

## 2024-12-31 PROCEDURE — 1159F MED LIST DOCD IN RCRD: CPT | Performed by: CLINICAL NURSE SPECIALIST

## 2024-12-31 PROCEDURE — 1124F ACP DISCUSS-NO DSCNMKR DOCD: CPT | Performed by: CLINICAL NURSE SPECIALIST

## 2024-12-31 PROCEDURE — 1160F RVW MEDS BY RX/DR IN RCRD: CPT | Performed by: CLINICAL NURSE SPECIALIST

## 2024-12-31 PROCEDURE — G0439 PPPS, SUBSEQ VISIT: HCPCS | Performed by: CLINICAL NURSE SPECIALIST

## 2024-12-31 PROCEDURE — 1036F TOBACCO NON-USER: CPT | Performed by: CLINICAL NURSE SPECIALIST

## 2024-12-31 PROCEDURE — 99214 OFFICE O/P EST MOD 30 MIN: CPT | Performed by: CLINICAL NURSE SPECIALIST

## 2024-12-31 PROCEDURE — 1170F FXNL STATUS ASSESSED: CPT | Performed by: CLINICAL NURSE SPECIALIST

## 2024-12-31 PROCEDURE — G0444 DEPRESSION SCREEN ANNUAL: HCPCS | Performed by: CLINICAL NURSE SPECIALIST

## 2024-12-31 ASSESSMENT — ENCOUNTER SYMPTOMS
DEPRESSION: 0
APPETITE CHANGE: 0
SHORTNESS OF BREATH: 0
FATIGUE: 0
DIARRHEA: 0
PALPITATIONS: 0
DIZZINESS: 0
ARTHRALGIAS: 0
BACK PAIN: 0
DYSURIA: 0
VOMITING: 0
HEADACHES: 0
CONSTIPATION: 0
COUGH: 0
POLYDIPSIA: 0
NECK PAIN: 0
OCCASIONAL FEELINGS OF UNSTEADINESS: 0
ACTIVITY CHANGE: 0
SLEEP DISTURBANCE: 0
FLANK PAIN: 0
FEVER: 0
CHEST TIGHTNESS: 0
SORE THROAT: 0
WHEEZING: 0
ABDOMINAL PAIN: 0
EYE PAIN: 0
PHOTOPHOBIA: 0
HEMATURIA: 0
SEIZURES: 0
MYALGIAS: 0
LOSS OF SENSATION IN FEET: 0
UNEXPECTED WEIGHT CHANGE: 0
CONFUSION: 0
NAUSEA: 0
BRUISES/BLEEDS EASILY: 0
BLOOD IN STOOL: 0
JOINT SWELLING: 0
TROUBLE SWALLOWING: 0
WOUND: 0
CHILLS: 0

## 2024-12-31 ASSESSMENT — ACTIVITIES OF DAILY LIVING (ADL)
DRESSING: INDEPENDENT
BATHING: INDEPENDENT
GROCERY_SHOPPING: INDEPENDENT
DOING_HOUSEWORK: INDEPENDENT
MANAGING_FINANCES: INDEPENDENT
TAKING_MEDICATION: INDEPENDENT

## 2025-04-04 ENCOUNTER — APPOINTMENT (OUTPATIENT)
Dept: CARDIOLOGY | Facility: CLINIC | Age: 75
End: 2025-04-04
Payer: MEDICARE

## 2025-04-07 ENCOUNTER — OFFICE VISIT (OUTPATIENT)
Dept: CARDIOLOGY | Facility: HOSPITAL | Age: 75
End: 2025-04-07
Payer: MEDICARE

## 2025-04-07 VITALS
OXYGEN SATURATION: 97 % | DIASTOLIC BLOOD PRESSURE: 80 MMHG | HEART RATE: 65 BPM | BODY MASS INDEX: 29.89 KG/M2 | SYSTOLIC BLOOD PRESSURE: 120 MMHG | HEIGHT: 66 IN | WEIGHT: 186 LBS

## 2025-04-07 DIAGNOSIS — I25.10 ASHD (ARTERIOSCLEROTIC HEART DISEASE): ICD-10-CM

## 2025-04-07 DIAGNOSIS — R53.83 OTHER FATIGUE: ICD-10-CM

## 2025-04-07 PROBLEM — R10.84 GENERALIZED ABDOMINAL PAIN: Status: ACTIVE | Noted: 2023-06-28

## 2025-04-07 PROBLEM — H91.90 HEARING LOSS: Status: ACTIVE | Noted: 2023-06-28

## 2025-04-07 PROBLEM — H66.90 OTITIS MEDIA: Status: ACTIVE | Noted: 2023-06-28

## 2025-04-07 LAB
ATRIAL RATE: 65 BPM
P AXIS: 54 DEGREES
P OFFSET: 177 MS
P ONSET: 130 MS
PR INTERVAL: 190 MS
Q ONSET: 225 MS
QRS COUNT: 11 BEATS
QRS DURATION: 70 MS
QT INTERVAL: 362 MS
QTC CALCULATION(BAZETT): 376 MS
QTC FREDERICIA: 371 MS
R AXIS: 51 DEGREES
T AXIS: 43 DEGREES
T OFFSET: 406 MS
VENTRICULAR RATE: 65 BPM

## 2025-04-07 PROCEDURE — 1159F MED LIST DOCD IN RCRD: CPT | Performed by: INTERNAL MEDICINE

## 2025-04-07 PROCEDURE — 93005 ELECTROCARDIOGRAM TRACING: CPT | Performed by: INTERNAL MEDICINE

## 2025-04-07 PROCEDURE — 1036F TOBACCO NON-USER: CPT | Performed by: INTERNAL MEDICINE

## 2025-04-07 PROCEDURE — 93010 ELECTROCARDIOGRAM REPORT: CPT | Performed by: INTERNAL MEDICINE

## 2025-04-07 PROCEDURE — 99213 OFFICE O/P EST LOW 20 MIN: CPT | Performed by: INTERNAL MEDICINE

## 2025-04-07 PROCEDURE — 1123F ACP DISCUSS/DSCN MKR DOCD: CPT | Performed by: INTERNAL MEDICINE

## 2025-04-07 PROCEDURE — 99213 OFFICE O/P EST LOW 20 MIN: CPT | Mod: 25 | Performed by: INTERNAL MEDICINE

## 2025-04-07 PROCEDURE — 1160F RVW MEDS BY RX/DR IN RCRD: CPT | Performed by: INTERNAL MEDICINE

## 2025-04-07 NOTE — PROGRESS NOTES
"Counseling:  The patient was counseled regarding diagnostic results, instructions for management, risk factor reductions, prognosis, patient and family education, impressions, risks and benefits of treatment options and importance of compliance with treatment.      Chief Complaint:   The patient presents today for annual followup of CAD and palpitations.     History Of Present Illness:    Joe Pathak is a 74 year old male patient who presents today for annual followup of CAD and palpitations. His PMH is significant for CAD s/p PCI to proximal LAD and proximal mid LAD in 2013, hyperlipidemia and sensorineural hearing loss with cochlear implant. Over the past year, the patient states that he has done well from a cardiac standpoint. He denies any CP, chest discomfort or SOB. He also denies any palpitations. BP has been stable. EKG today shows NSR with no acute changes. The patient is compliant with his prescribed medications.       Last Recorded Vitals:  Vitals:    04/07/25 1459   BP: 120/80   BP Location: Left arm   Patient Position: Sitting   BP Cuff Size: Adult   Pulse: 65   SpO2: 97%   Weight: 84.4 kg (186 lb)   Height: 1.676 m (5' 6\")       Past Surgical History:  He has a past surgical history that includes Coronary angioplasty (09/06/2016); Tonsillectomy (09/12/2016); and Back surgery (09/12/2016).      Social History:  He reports that he has never smoked. He has never used smokeless tobacco. He reports that he does not drink alcohol and does not use drugs.    Family History:  No family history on file.     Allergies:  Atorvastatin, Lovastatin, Niacin, Rosuvastatin, and Statins-hmg-coa reductase inhibitors    Outpatient Medications:  Current Outpatient Medications   Medication Instructions    aspirin 81 mg, Every 24 hours    celecoxib (CELEBREX) 200 mg, oral, 2 times daily    clopidogrel (PLAVIX) 75 mg, Every 24 hours    co-enzyme Q-10 (COQ-10) 30 mg, Every 24 hours    DOCOSAHEXAENOIC ACID ORAL omega " 3-dha-epa-fish oil (Fish OiL) 1,000 mg (120 mg-180 mg) capsule 1 capsule (1,000 mg) once every 24 hours. 0 Active    metoprolol tartrate (LOPRESSOR) 12.5 mg, oral, 2 times daily    multivitamin with folic acid (One Daily Multivitamin) 400 mcg tablet 1 tablet, Daily    Repatha SureClick 140 mg, subcutaneous, Every 14 days     Review of Systems   All other systems reviewed and are negative.     Physical Exam:  Constitutional:       Appearance: Healthy appearance. Not in distress.   Neck:      Vascular: No JVR. JVD normal.   Pulmonary:      Effort: Pulmonary effort is normal.      Breath sounds: Normal breath sounds. No wheezing. No rhonchi. No rales.   Chest:      Chest wall: Not tender to palpatation.   Cardiovascular:      PMI at left midclavicular line. Normal rate. Regular rhythm. Normal S1. Normal S2.       Murmurs: There is no murmur.      No gallop.  No click. No rub.   Pulses:     Intact distal pulses.   Edema:     Peripheral edema absent.   Abdominal:      General: Bowel sounds are normal.      Palpations: Abdomen is soft.      Tenderness: There is no abdominal tenderness.   Musculoskeletal: Normal range of motion.         General: No tenderness. Skin:     General: Skin is warm and dry.   Neurological:      General: No focal deficit present.      Mental Status: Alert and oriented to person, place and time.          Last Labs:  CBC -  Lab Results   Component Value Date    WBC 4.6 02/02/2024    HGB 14.9 02/02/2024    HCT 46.8 02/02/2024    MCV 98 02/02/2024     02/02/2024       CMP -  Lab Results   Component Value Date    CALCIUM 9.1 02/02/2024    PROT 7.4 02/02/2024    ALBUMIN 4.5 02/02/2024    AST 25 02/02/2024    ALT 23 02/02/2024    ALKPHOS 43 02/02/2024    BILITOT 0.7 02/02/2024       LIPID PANEL -   Lab Results   Component Value Date    CHOL 136 02/02/2024    TRIG 76 02/02/2024    HDL 54.6 02/02/2024    CHHDL 2.5 02/02/2024    LDLF 63 01/04/2023    VLDL 15 02/02/2024    NHDL 81 02/02/2024        RENAL FUNCTION PANEL -   Lab Results   Component Value Date    GLUCOSE 96 02/02/2024     02/02/2024    K 5.1 02/02/2024     02/02/2024    CO2 25 02/02/2024    ANIONGAP 13 02/02/2024    BUN 22 02/02/2024    CREATININE 0.92 02/02/2024    GFRMALE 86 01/04/2023    CALCIUM 9.1 02/02/2024    ALBUMIN 4.5 02/02/2024        Last Cardiology Tests:  12/12/2023 - Exercise Stress Echo  1. No clinical or electrocardiographic evidence for ischemia at maximal workload.  2. Adequate level of stress achieved.  3. Normal global left ventricular systolic function.  4. The resting ejection fraction was estimated at 55 to 60% with a peak exercise ejection fraction estimated at 60 to 65%.  5. There were no stress-induced wall motion abnormalities. This is a negative stress echo test for ischemia.    11/22/2023 to 12/06/2023 - Holter Monitor  1. Predominant underlying rhythm was sinus rhythm; min HR 45 bpm, max  bpm, avg HR 72 bpm.  2. 7 supraventricular tachycardia runs occurred; fastest interval lasting 5 beats with max rate 174 bpm, longest lasting 17 b eats with avg rate 125 bpm.  3. Isolated SVEs were rare, SVE couplets were rare, and SVE triplets were rare.  4. Isolated VEs were rare, no VE couplets were present, and VE triplets were rare.    01/22/2013 - Cardiac Catheterization  1. Severe two vessel coronary artery disease.  2. Successful stenting of the 70% stenosis in the proximal left anterior descending artery.  3. Successful angioplasty and successful stenting of the 99% stenosis in the proximal to mid left anterior descending artery.  4. 60% stenosis in the distal right coronary artery.  5. The left ventricular ejection fraction was 60%.  6. Normal left ventricular size and contractility.     Lab review: I have personally reviewed the laboratory result(s).    Assessment/Plan   1) CAD s/p PCI in Past  On ASA 81 mg daily, Repatha 140 mg/mL every 2 weeks, metoprolol tartrate 12.5 mg BID  Exercise stress  echo 12/12/2023 negative for ischemia   Lipid panel 02/02/2024 with LDL of 66  Denies CP, chest discomfort or SOB  BP stable  EKG stable  Check CBC, CMP, Lipid Panel, TSH  Continue current medical Rx   F/U 1 year     2) Palpitations  On metoprolol tartrate 12.5 mg BID  Holter 11/22/2023 to 12/06/2023 with 7 runs of SVT  Palpitations well managed on medical therapy   Continue current medical Rx   F/U 1 year       Scribe Attestation  By signing my name below, I, Clayton Marte   attest that this documentation has been prepared under the direction and in the presence of Benji Heranndez MD.

## 2025-04-07 NOTE — LETTER
"April 7, 2025     Katelin Benson, MARANDA-MARIANNA  [address]  [facility] [address]    Patient: Joe Pathak   YOB: 1950   Date of Visit: 4/7/2025       Dear LOURDES Sampson:    Thank you for referring Joe Pathak to me for evaluation. Below are my notes for this consultation.  If you have questions, please do not hesitate to call me. I look forward to following your patient along with you.       Sincerely,     Benji Hernandez MD      CC: No Recipients  ______________________________________________________________________________________    Counseling:  The patient was counseled regarding diagnostic results, instructions for management, risk factor reductions, prognosis, patient and family education, impressions, risks and benefits of treatment options and importance of compliance with treatment.      Chief Complaint:   The patient presents today for annual followup of CAD and palpitations.     History Of Present Illness:    Joe Pathak is a 74 year old male patient who presents today for annual followup of CAD and palpitations. His PMH is significant for CAD s/p PCI to proximal LAD and proximal mid LAD in 2013, hyperlipidemia and sensorineural hearing loss with cochlear implant. Over the past year, the patient states that he has done well from a cardiac standpoint. He denies any CP, chest discomfort or SOB. He also denies any palpitations. BP has been stable. EKG today shows NSR with no acute changes. The patient is compliant with his prescribed medications.       Last Recorded Vitals:  Vitals:    04/07/25 1459   BP: 120/80   BP Location: Left arm   Patient Position: Sitting   BP Cuff Size: Adult   Pulse: 65   SpO2: 97%   Weight: 84.4 kg (186 lb)   Height: 1.676 m (5' 6\")       Past Surgical History:  He has a past surgical history that includes Coronary angioplasty (09/06/2016); Tonsillectomy (09/12/2016); and Back surgery " (09/12/2016).      Social History:  He reports that he has never smoked. He has never used smokeless tobacco. He reports that he does not drink alcohol and does not use drugs.    Family History:  No family history on file.     Allergies:  Atorvastatin, Lovastatin, Niacin, Rosuvastatin, and Statins-hmg-coa reductase inhibitors    Outpatient Medications:  Current Outpatient Medications   Medication Instructions   • aspirin 81 mg, Every 24 hours   • celecoxib (CELEBREX) 200 mg, oral, 2 times daily   • clopidogrel (PLAVIX) 75 mg, Every 24 hours   • co-enzyme Q-10 (COQ-10) 30 mg, Every 24 hours   • DOCOSAHEXAENOIC ACID ORAL omega 3-dha-epa-fish oil (Fish OiL) 1,000 mg (120 mg-180 mg) capsule 1 capsule (1,000 mg) once every 24 hours. 0 Active   • metoprolol tartrate (LOPRESSOR) 12.5 mg, oral, 2 times daily   • multivitamin with folic acid (One Daily Multivitamin) 400 mcg tablet 1 tablet, Daily   • Repatha SureClick 140 mg, subcutaneous, Every 14 days     Review of Systems   All other systems reviewed and are negative.     Physical Exam:  Constitutional:       Appearance: Healthy appearance. Not in distress.   Neck:      Vascular: No JVR. JVD normal.   Pulmonary:      Effort: Pulmonary effort is normal.      Breath sounds: Normal breath sounds. No wheezing. No rhonchi. No rales.   Chest:      Chest wall: Not tender to palpatation.   Cardiovascular:      PMI at left midclavicular line. Normal rate. Regular rhythm. Normal S1. Normal S2.       Murmurs: There is no murmur.      No gallop.  No click. No rub.   Pulses:     Intact distal pulses.   Edema:     Peripheral edema absent.   Abdominal:      General: Bowel sounds are normal.      Palpations: Abdomen is soft.      Tenderness: There is no abdominal tenderness.   Musculoskeletal: Normal range of motion.         General: No tenderness. Skin:     General: Skin is warm and dry.   Neurological:      General: No focal deficit present.      Mental Status: Alert and oriented to  person, place and time.          Last Labs:  CBC -  Lab Results   Component Value Date    WBC 4.6 02/02/2024    HGB 14.9 02/02/2024    HCT 46.8 02/02/2024    MCV 98 02/02/2024     02/02/2024       CMP -  Lab Results   Component Value Date    CALCIUM 9.1 02/02/2024    PROT 7.4 02/02/2024    ALBUMIN 4.5 02/02/2024    AST 25 02/02/2024    ALT 23 02/02/2024    ALKPHOS 43 02/02/2024    BILITOT 0.7 02/02/2024       LIPID PANEL -   Lab Results   Component Value Date    CHOL 136 02/02/2024    TRIG 76 02/02/2024    HDL 54.6 02/02/2024    CHHDL 2.5 02/02/2024    LDLF 63 01/04/2023    VLDL 15 02/02/2024    NHDL 81 02/02/2024       RENAL FUNCTION PANEL -   Lab Results   Component Value Date    GLUCOSE 96 02/02/2024     02/02/2024    K 5.1 02/02/2024     02/02/2024    CO2 25 02/02/2024    ANIONGAP 13 02/02/2024    BUN 22 02/02/2024    CREATININE 0.92 02/02/2024    GFRMALE 86 01/04/2023    CALCIUM 9.1 02/02/2024    ALBUMIN 4.5 02/02/2024        Last Cardiology Tests:  12/12/2023 - Exercise Stress Echo  1. No clinical or electrocardiographic evidence for ischemia at maximal workload.  2. Adequate level of stress achieved.  3. Normal global left ventricular systolic function.  4. The resting ejection fraction was estimated at 55 to 60% with a peak exercise ejection fraction estimated at 60 to 65%.  5. There were no stress-induced wall motion abnormalities. This is a negative stress echo test for ischemia.    11/22/2023 to 12/06/2023 - Holter Monitor  1. Predominant underlying rhythm was sinus rhythm; min HR 45 bpm, max  bpm, avg HR 72 bpm.  2. 7 supraventricular tachycardia runs occurred; fastest interval lasting 5 beats with max rate 174 bpm, longest lasting 17 b eats with avg rate 125 bpm.  3. Isolated SVEs were rare, SVE couplets were rare, and SVE triplets were rare.  4. Isolated VEs were rare, no VE couplets were present, and VE triplets were rare.    01/22/2013 - Cardiac Catheterization  1. Severe two  vessel coronary artery disease.  2. Successful stenting of the 70% stenosis in the proximal left anterior descending artery.  3. Successful angioplasty and successful stenting of the 99% stenosis in the proximal to mid left anterior descending artery.  4. 60% stenosis in the distal right coronary artery.  5. The left ventricular ejection fraction was 60%.  6. Normal left ventricular size and contractility.     Lab review: I have personally reviewed the laboratory result(s).    Assessment/Plan  1) CAD s/p PCI in Past  On ASA 81 mg daily, Repatha 140 mg/mL every 2 weeks, metoprolol tartrate 12.5 mg BID  Exercise stress echo 12/12/2023 negative for ischemia   Lipid panel 02/02/2024 with LDL of 66  Denies CP, chest discomfort or SOB  BP stable  EKG stable  Check CBC, CMP, Lipid Panel, TSH  Continue current medical Rx   F/U 1 year     2) Palpitations  On metoprolol tartrate 12.5 mg BID  Holter 11/22/2023 to 12/06/2023 with 7 runs of SVT  Palpitations well managed on medical therapy   Continue current medical Rx   F/U 1 year       Scribe Attestation  By signing my name below, I, Clayton Marte   attest that this documentation has been prepared under the direction and in the presence of Benji Hernandez MD.

## 2025-04-30 ENCOUNTER — HOSPITAL ENCOUNTER (OUTPATIENT)
Dept: RADIOLOGY | Facility: HOSPITAL | Age: 75
Discharge: HOME | End: 2025-04-30
Payer: MEDICARE

## 2025-04-30 ENCOUNTER — TELEPHONE (OUTPATIENT)
Dept: PRIMARY CARE | Facility: CLINIC | Age: 75
End: 2025-04-30
Payer: MEDICARE

## 2025-04-30 DIAGNOSIS — M25.511 ACUTE PAIN OF RIGHT SHOULDER: ICD-10-CM

## 2025-04-30 PROCEDURE — 73030 X-RAY EXAM OF SHOULDER: CPT | Mod: RT

## 2025-04-30 PROCEDURE — 73030 X-RAY EXAM OF SHOULDER: CPT | Mod: RIGHT SIDE | Performed by: RADIOLOGY

## 2025-04-30 NOTE — TELEPHONE ENCOUNTER
Pended, please sign.     Tried calling patient twice, first time I was hung up on. Second time, LMTRC

## 2025-04-30 NOTE — TELEPHONE ENCOUNTER
Right shoulder pain for a month, constant, worse laying down and a night. Patient has tried over the counter pain meds that do not really help. Imelda does not want to wait until next appointment in June.  Please advise  [facility]  Sched 6/30

## 2025-05-09 ENCOUNTER — TELEPHONE (OUTPATIENT)
Dept: PRIMARY CARE | Facility: CLINIC | Age: 75
End: 2025-05-09
Payer: MEDICARE

## 2025-05-09 NOTE — TELEPHONE ENCOUNTER
Celebrex not really helping and wants to know if you want to change his medication to something different.  Please advise

## 2025-05-12 ENCOUNTER — PATIENT MESSAGE (OUTPATIENT)
Dept: PRIMARY CARE | Facility: CLINIC | Age: 75
End: 2025-05-12
Payer: MEDICARE

## 2025-05-12 DIAGNOSIS — M54.50 LOW BACK PAIN, UNSPECIFIED BACK PAIN LATERALITY, UNSPECIFIED CHRONICITY, UNSPECIFIED WHETHER SCIATICA PRESENT: Primary | ICD-10-CM

## 2025-05-12 RX ORDER — MELOXICAM 15 MG/1
15 TABLET ORAL DAILY
Qty: 30 TABLET | Refills: 11 | Status: SHIPPED | OUTPATIENT
Start: 2025-05-12 | End: 2026-05-12

## 2025-05-13 LAB
25(OH)D3+25(OH)D2 SERPL-MCNC: 38 NG/ML (ref 30–100)
ALBUMIN SERPL-MCNC: 4.4 G/DL (ref 3.6–5.1)
ALBUMIN SERPL-MCNC: 4.4 G/DL (ref 3.6–5.1)
ALP SERPL-CCNC: 37 U/L (ref 35–144)
ALP SERPL-CCNC: 39 U/L (ref 35–144)
ALT SERPL-CCNC: 17 U/L (ref 9–46)
ALT SERPL-CCNC: 19 U/L (ref 9–46)
ANION GAP SERPL CALCULATED.4IONS-SCNC: 7 MMOL/L (CALC) (ref 7–17)
ANION GAP SERPL CALCULATED.4IONS-SCNC: 8 MMOL/L (CALC) (ref 7–17)
AST SERPL-CCNC: 22 U/L (ref 10–35)
AST SERPL-CCNC: 24 U/L (ref 10–35)
BASOPHILS # BLD AUTO: 62 CELLS/UL (ref 0–200)
BASOPHILS NFR BLD AUTO: 1.4 %
BILIRUB SERPL-MCNC: 0.6 MG/DL (ref 0.2–1.2)
BILIRUB SERPL-MCNC: 0.6 MG/DL (ref 0.2–1.2)
BUN SERPL-MCNC: 19 MG/DL (ref 7–25)
BUN SERPL-MCNC: 19 MG/DL (ref 7–25)
CALCIUM SERPL-MCNC: 8.9 MG/DL (ref 8.6–10.3)
CALCIUM SERPL-MCNC: 9.3 MG/DL (ref 8.6–10.3)
CHLORIDE SERPL-SCNC: 104 MMOL/L (ref 98–110)
CHLORIDE SERPL-SCNC: 105 MMOL/L (ref 98–110)
CHOLEST SERPL-MCNC: 119 MG/DL
CHOLEST SERPL-MCNC: 120 MG/DL
CHOLEST/HDLC SERPL: 2.3 (CALC)
CHOLEST/HDLC SERPL: 2.4 (CALC)
CO2 SERPL-SCNC: 26 MMOL/L (ref 20–32)
CO2 SERPL-SCNC: 26 MMOL/L (ref 20–32)
CREAT SERPL-MCNC: 0.81 MG/DL (ref 0.7–1.28)
CREAT SERPL-MCNC: 0.85 MG/DL (ref 0.7–1.28)
EGFRCR SERPLBLD CKD-EPI 2021: 91 ML/MIN/1.73M2
EGFRCR SERPLBLD CKD-EPI 2021: 92 ML/MIN/1.73M2
EOSINOPHIL # BLD AUTO: 128 CELLS/UL (ref 15–500)
EOSINOPHIL NFR BLD AUTO: 2.9 %
ERYTHROCYTE [DISTWIDTH] IN BLOOD BY AUTOMATED COUNT: 12.5 % (ref 11–15)
ERYTHROCYTE [DISTWIDTH] IN BLOOD BY AUTOMATED COUNT: 13 % (ref 11–15)
GLUCOSE SERPL-MCNC: 111 MG/DL (ref 65–99)
GLUCOSE SERPL-MCNC: 114 MG/DL (ref 65–99)
HCT VFR BLD AUTO: 43.5 % (ref 38.5–50)
HCT VFR BLD AUTO: 43.5 % (ref 38.5–50)
HDLC SERPL-MCNC: 51 MG/DL
HDLC SERPL-MCNC: 51 MG/DL
HGB BLD-MCNC: 14.2 G/DL (ref 13.2–17.1)
HGB BLD-MCNC: 14.4 G/DL (ref 13.2–17.1)
LDLC SERPL CALC-MCNC: 54 MG/DL (CALC)
LDLC SERPL CALC-MCNC: 55 MG/DL (CALC)
LYMPHOCYTES # BLD AUTO: 1302 CELLS/UL (ref 850–3900)
LYMPHOCYTES NFR BLD AUTO: 29.6 %
MCH RBC QN AUTO: 31.6 PG (ref 27–33)
MCH RBC QN AUTO: 32.1 PG (ref 27–33)
MCHC RBC AUTO-ENTMCNC: 32.6 G/DL (ref 32–36)
MCHC RBC AUTO-ENTMCNC: 33.1 G/DL (ref 32–36)
MCV RBC AUTO: 96.9 FL (ref 80–100)
MCV RBC AUTO: 97.1 FL (ref 80–100)
MONOCYTES # BLD AUTO: 546 CELLS/UL (ref 200–950)
MONOCYTES NFR BLD AUTO: 12.4 %
NEUTROPHILS # BLD AUTO: 2363 CELLS/UL (ref 1500–7800)
NEUTROPHILS NFR BLD AUTO: 53.7 %
NONHDLC SERPL-MCNC: 68 MG/DL (CALC)
NONHDLC SERPL-MCNC: 69 MG/DL (CALC)
PLATELET # BLD AUTO: 221 THOUSAND/UL (ref 140–400)
PLATELET # BLD AUTO: 225 THOUSAND/UL (ref 140–400)
PMV BLD REES-ECKER: 11.1 FL (ref 7.5–12.5)
PMV BLD REES-ECKER: 11.3 FL (ref 7.5–12.5)
POTASSIUM SERPL-SCNC: 4.5 MMOL/L (ref 3.5–5.3)
POTASSIUM SERPL-SCNC: 4.6 MMOL/L (ref 3.5–5.3)
PROT SERPL-MCNC: 7 G/DL (ref 6.1–8.1)
PROT SERPL-MCNC: 7.2 G/DL (ref 6.1–8.1)
RBC # BLD AUTO: 4.48 MILLION/UL (ref 4.2–5.8)
RBC # BLD AUTO: 4.49 MILLION/UL (ref 4.2–5.8)
SODIUM SERPL-SCNC: 138 MMOL/L (ref 135–146)
SODIUM SERPL-SCNC: 138 MMOL/L (ref 135–146)
TRIGL SERPL-MCNC: 64 MG/DL
TRIGL SERPL-MCNC: 65 MG/DL
TSH SERPL-ACNC: 2.42 MIU/L (ref 0.4–4.5)
TSH SERPL-ACNC: 2.58 MIU/L (ref 0.4–4.5)
VIT B12 SERPL-MCNC: 425 PG/ML (ref 200–1100)
WBC # BLD AUTO: 4.4 THOUSAND/UL (ref 3.8–10.8)
WBC # BLD AUTO: 4.4 THOUSAND/UL (ref 3.8–10.8)

## 2025-05-15 ENCOUNTER — TELEPHONE (OUTPATIENT)
Dept: CARDIOLOGY | Facility: HOSPITAL | Age: 75
End: 2025-05-15
Payer: MEDICARE

## 2025-05-15 DIAGNOSIS — I25.10 ASHD (ARTERIOSCLEROTIC HEART DISEASE): ICD-10-CM

## 2025-05-15 DIAGNOSIS — E78.2 MIXED HYPERLIPIDEMIA: ICD-10-CM

## 2025-05-15 RX ORDER — EVOLOCUMAB 140 MG/ML
140 INJECTION, SOLUTION SUBCUTANEOUS
Qty: 6 ML | Refills: 3 | Status: SHIPPED | OUTPATIENT
Start: 2025-05-15

## 2025-05-15 NOTE — TELEPHONE ENCOUNTER
Patient called office asking for a refill of his evolocumab (Repatha SureClick) 140 mg/mL injection [[id number]] and asks for it to be sent to [facility] - [place] - [address] [address]   [address], [place] [id number] Patient asks for a 90 day supply.

## 2025-05-16 ENCOUNTER — DOCUMENTATION (OUTPATIENT)
Dept: CARDIOLOGY | Facility: HOSPITAL | Age: 75
End: 2025-05-16
Payer: MEDICARE

## 2025-05-16 NOTE — PROGRESS NOTES
5/16/25  0820  PA for Repatha submitted with the following outcome:  ETELVINA CATHERINE (Key: [id number])  Rx #: [id number]  Need Help? Call us at [phone]  Outcome  Additional Information Required  A pending Prior Authorization request for the Patient and Medication exist.

## 2025-06-05 DIAGNOSIS — I25.10 ASHD (ARTERIOSCLEROTIC HEART DISEASE): ICD-10-CM

## 2025-06-05 RX ORDER — METOPROLOL TARTRATE 25 MG/1
12.5 TABLET, FILM COATED ORAL 2 TIMES DAILY
Qty: 90 TABLET | Refills: 3 | Status: SHIPPED | OUTPATIENT
Start: 2025-06-05

## 2025-06-27 ENCOUNTER — OFFICE VISIT (OUTPATIENT)
Dept: PRIMARY CARE | Facility: CLINIC | Age: 75
End: 2025-06-27
Payer: MEDICARE

## 2025-06-27 VITALS
HEIGHT: 66 IN | HEART RATE: 64 BPM | DIASTOLIC BLOOD PRESSURE: 60 MMHG | SYSTOLIC BLOOD PRESSURE: 118 MMHG | BODY MASS INDEX: 28.93 KG/M2 | WEIGHT: 180 LBS

## 2025-06-27 DIAGNOSIS — M79.606 PAIN OF LOWER EXTREMITY, UNSPECIFIED LATERALITY: ICD-10-CM

## 2025-06-27 DIAGNOSIS — E55.9 VITAMIN D DEFICIENCY: ICD-10-CM

## 2025-06-27 DIAGNOSIS — E78.2 MIXED HYPERLIPIDEMIA: ICD-10-CM

## 2025-06-27 DIAGNOSIS — M54.50 LOW BACK PAIN, UNSPECIFIED BACK PAIN LATERALITY, UNSPECIFIED CHRONICITY, UNSPECIFIED WHETHER SCIATICA PRESENT: ICD-10-CM

## 2025-06-27 DIAGNOSIS — I25.10 ASHD (ARTERIOSCLEROTIC HEART DISEASE): ICD-10-CM

## 2025-06-27 DIAGNOSIS — M25.561 RIGHT KNEE PAIN, UNSPECIFIED CHRONICITY: Primary | ICD-10-CM

## 2025-06-27 DIAGNOSIS — R73.02 IMPAIRED GLUCOSE TOLERANCE: ICD-10-CM

## 2025-06-27 DIAGNOSIS — Z96.21 COCHLEAR IMPLANT IN PLACE: ICD-10-CM

## 2025-06-27 DIAGNOSIS — H91.91 HEARING LOSS OF RIGHT EAR, UNSPECIFIED HEARING LOSS TYPE: ICD-10-CM

## 2025-06-27 PROCEDURE — 1159F MED LIST DOCD IN RCRD: CPT | Performed by: CLINICAL NURSE SPECIALIST

## 2025-06-27 PROCEDURE — G2211 COMPLEX E/M VISIT ADD ON: HCPCS | Performed by: CLINICAL NURSE SPECIALIST

## 2025-06-27 PROCEDURE — 99214 OFFICE O/P EST MOD 30 MIN: CPT | Performed by: CLINICAL NURSE SPECIALIST

## 2025-06-27 PROCEDURE — 1160F RVW MEDS BY RX/DR IN RCRD: CPT | Performed by: CLINICAL NURSE SPECIALIST

## 2025-06-27 PROCEDURE — 1036F TOBACCO NON-USER: CPT | Performed by: CLINICAL NURSE SPECIALIST

## 2025-06-27 ASSESSMENT — ENCOUNTER SYMPTOMS
SLEEP DISTURBANCE: 0
SEIZURES: 0
FEVER: 0
ABDOMINAL PAIN: 0
DIZZINESS: 0
ARTHRALGIAS: 1
EYE PAIN: 0
NECK PAIN: 0
OCCASIONAL FEELINGS OF UNSTEADINESS: 0
CONFUSION: 0
WOUND: 0
CONSTIPATION: 0
CHILLS: 0
SORE THROAT: 0
MYALGIAS: 0
UNEXPECTED WEIGHT CHANGE: 0
HEMATURIA: 0
TROUBLE SWALLOWING: 0
APPETITE CHANGE: 0
DYSURIA: 0
WHEEZING: 0
NAUSEA: 0
PHOTOPHOBIA: 0
JOINT SWELLING: 0
DEPRESSION: 0
COUGH: 0
PALPITATIONS: 0
BLOOD IN STOOL: 0
VOMITING: 0
FLANK PAIN: 0
CHEST TIGHTNESS: 0
BACK PAIN: 0
DIARRHEA: 0
SHORTNESS OF BREATH: 0
BRUISES/BLEEDS EASILY: 0
LOSS OF SENSATION IN FEET: 0
ACTIVITY CHANGE: 0
FATIGUE: 0
POLYDIPSIA: 0
HEADACHES: 0

## 2025-06-27 ASSESSMENT — PATIENT HEALTH QUESTIONNAIRE - PHQ9
2. FEELING DOWN, DEPRESSED OR HOPELESS: NOT AT ALL
1. LITTLE INTEREST OR PLEASURE IN DOING THINGS: NOT AT ALL
SUM OF ALL RESPONSES TO PHQ9 QUESTIONS 1 AND 2: 0

## 2025-06-27 ASSESSMENT — COLUMBIA-SUICIDE SEVERITY RATING SCALE - C-SSRS
6. HAVE YOU EVER DONE ANYTHING, STARTED TO DO ANYTHING, OR PREPARED TO DO ANYTHING TO END YOUR LIFE?: NO
2. HAVE YOU ACTUALLY HAD ANY THOUGHTS OF KILLING YOURSELF?: NO
1. IN THE PAST MONTH, HAVE YOU WISHED YOU WERE DEAD OR WISHED YOU COULD GO TO SLEEP AND NOT WAKE UP?: NO

## 2025-06-27 NOTE — PROGRESS NOTES
Subjective   Patient ID: Joe Pathak is a 74 y.o. male who presents for Follow-up (Follow up).  HPI    Here today as a follow up appointment.      Follows with Dr. Hernandez for Cardiology. CAD PCI in the past. Has been stable on medication, intolerant to Statins. No changes made at last OV in April 2025, following annually.      Patient states that he has developed lower extremity pain. Extending from the buttocks down his legs. Has taken OTC pain medication with some improvement. Worse when trying to sleep at night. Patient states that when he is up in moving will help with his symptoms. No improvement with Gabapentin. Completed PT with minimal improvement. Flexeril PRN. Has noticed improvement with Celebrex. MRI completed.     Now with increased pain in his right knee. Interested in injection.      Was having complaints of heart palpitations and fluttering. Stress/ECHO and Holter completed. States that after the Holter was placed no further issues/concerns. Has been feeling well.     Review of Systems   Constitutional:  Negative for activity change, appetite change, chills, fatigue, fever and unexpected weight change.   HENT:  Positive for hearing loss. Negative for ear pain, nosebleeds, sore throat, tinnitus and trouble swallowing.    Eyes:  Negative for photophobia, pain and visual disturbance.   Respiratory:  Negative for cough, chest tightness, shortness of breath and wheezing.    Cardiovascular:  Negative for chest pain, palpitations and leg swelling.   Gastrointestinal:  Negative for abdominal pain, blood in stool, constipation, diarrhea, nausea and vomiting.   Endocrine: Negative for cold intolerance, heat intolerance, polydipsia and polyuria.   Genitourinary:  Negative for dysuria, flank pain and hematuria.   Musculoskeletal:  Positive for arthralgias. Negative for back pain, joint swelling, myalgias and neck pain.   Skin:  Negative for pallor, rash and wound.   Allergic/Immunologic: Negative for  immunocompromised state.   Neurological:  Negative for dizziness, seizures and headaches.   Hematological:  Does not bruise/bleed easily.   Psychiatric/Behavioral:  Negative for confusion and sleep disturbance.        Objective   Physical Exam  Vitals and nursing note reviewed.   Constitutional:       General: He is not in acute distress.     Appearance: Normal appearance.   HENT:      Head: Normocephalic.      Nose: Nose normal.   Eyes:      Conjunctiva/sclera: Conjunctivae normal.   Neck:      Vascular: No carotid bruit.   Cardiovascular:      Rate and Rhythm: Normal rate and regular rhythm.      Pulses: Normal pulses.      Heart sounds: Normal heart sounds.   Pulmonary:      Effort: Pulmonary effort is normal.      Breath sounds: Normal breath sounds.   Abdominal:      General: Bowel sounds are normal.      Palpations: Abdomen is soft.   Musculoskeletal:         General: Normal range of motion.      Cervical back: Normal range of motion.   Skin:     General: Skin is warm and dry.   Neurological:      Mental Status: He is alert and oriented to person, place, and time. Mental status is at baseline.   Psychiatric:         Mood and Affect: Mood normal.         Behavior: Behavior normal.         Assessment/Plan       Reviewed results of blood work completed with patient.      Coronary artery disease with history of stent placement 2013. Currently asymptomatic. He continues on aspirin and beta-blocker. He has been statin intolerant. Blood pressure well controlled. He has follow-up with cardiology scheduled.   Dyslipidemia. History of statin myopathy. Currently doing well on Praluent since 2020.   Hearing loss status post cochlear implant. Referral to Audiology.   Lower Extremity Pain, Back Pain: Discontinued Gabapentin without relief. Flexeril PRN. MRI completed. Continue Meloxicam. Feels more effective than Celebrex.   Knee Pain: Orthopedics referral.      Colonoscopy: October 2024.   PSA: February 2024.   COVID  Vaccine: December 2024.   Medicare Wellness: December 2024.   Pneumovax: April 2021.   Prevnar 20: January 2024.   Flu Vaccine: Fall 2024.   RSV Vaccine: December 2023.   Discussed Shingrix, and Tdap.     Katelin Benson, APRN-CNS 06/27/25 9:44 AM

## 2025-06-30 ENCOUNTER — APPOINTMENT (OUTPATIENT)
Dept: PRIMARY CARE | Facility: CLINIC | Age: 75
End: 2025-06-30
Payer: MEDICARE

## 2025-07-09 ENCOUNTER — APPOINTMENT (OUTPATIENT)
Dept: ORTHOPEDIC SURGERY | Facility: HOSPITAL | Age: 75
End: 2025-07-09
Payer: MEDICARE

## 2025-07-09 DIAGNOSIS — M25.561 RIGHT KNEE PAIN, UNSPECIFIED CHRONICITY: ICD-10-CM

## 2025-07-10 ENCOUNTER — HOSPITAL ENCOUNTER (OUTPATIENT)
Dept: RADIOLOGY | Facility: HOSPITAL | Age: 75
Discharge: HOME | End: 2025-07-10
Payer: MEDICARE

## 2025-07-10 ENCOUNTER — OFFICE VISIT (OUTPATIENT)
Dept: ORTHOPEDIC SURGERY | Facility: HOSPITAL | Age: 75
End: 2025-07-10
Payer: MEDICARE

## 2025-07-10 VITALS — HEIGHT: 66 IN | BODY MASS INDEX: 28.93 KG/M2 | WEIGHT: 180 LBS

## 2025-07-10 DIAGNOSIS — M17.11 PRIMARY OSTEOARTHRITIS OF RIGHT KNEE: Primary | ICD-10-CM

## 2025-07-10 DIAGNOSIS — M25.561 RIGHT KNEE PAIN, UNSPECIFIED CHRONICITY: ICD-10-CM

## 2025-07-10 DIAGNOSIS — R52 PAIN: ICD-10-CM

## 2025-07-10 PROCEDURE — 1159F MED LIST DOCD IN RCRD: CPT

## 2025-07-10 PROCEDURE — 99204 OFFICE O/P NEW MOD 45 MIN: CPT

## 2025-07-10 PROCEDURE — 1036F TOBACCO NON-USER: CPT

## 2025-07-10 PROCEDURE — 73562 X-RAY EXAM OF KNEE 3: CPT | Mod: RT

## 2025-07-10 PROCEDURE — 99212 OFFICE O/P EST SF 10 MIN: CPT

## 2025-07-10 PROCEDURE — 73560 X-RAY EXAM OF KNEE 1 OR 2: CPT | Mod: LT

## 2025-07-10 ASSESSMENT — PAIN - FUNCTIONAL ASSESSMENT: PAIN_FUNCTIONAL_ASSESSMENT: 0-10

## 2025-07-10 ASSESSMENT — PAIN SCALES - GENERAL: PAINLEVEL_OUTOF10: 5 - MODERATE PAIN

## 2025-07-10 NOTE — PROGRESS NOTES
New patient - right knee pain for the last few years   States that he did have knee surgery done a few years ago but unsure what was done  States that he is on his feet all day long and at the end of the day he can barley walk because his knee is in so much pain  No injury  Xray done

## 2025-07-10 NOTE — PROGRESS NOTES
PRIMARY CARE PHYSICIAN: LOURDES Abraham  REFERRING PROVIDER: LOURDES Abraham  [address]  [place] [address]     ORTHOPAEDIC CONSULT: Knee Evaluation        ASSESSMENT & PLAN    IMPRESSION:   Right knee osteoarthritis    PLAN:   -Based on the history, physical exam and imaging studies above, the patient's presentation is consistent with the above diagnosis.  I had a long discussion with the patient regarding their presentation and the treatment options.  We discussed initial nonoperative versus operative management options as well as potential further diagnostic imaging.  We again discussed their treatment options going forward along with their associated risks and benefits. After thorough discussion, the patient has elected to proceed with conservative management. All questions were answered to the patients satisfaction who seems satisfied with the plan.  They will call the office with any questions/concerns.   - Patient was provided with a knee conditioning home exercise program to complete  - Recommend topical Voltaren up to 4 times daily in combination with oral Celebrex.  May also use Tylenol as needed for breakthrough pain.  - Plan on following up on 7/15/2025 for right knee corticosteroid injection      SUBJECTIVE  CHIEF COMPLAINT: No chief complaint on file.       HPI: Joe Pathak is a 75 y.o. patient. Joe Pathak has had progressive problems with theirright knee over the past 2 years. They do not report any trauma. They do not report any constant or progressive numbness or tingling in their legs. Their symptoms are interfering with activities which include walking, getting up from a seated position, worse at the end of the workday.     FUNCTIONAL STATUS: occasionally limited.  AMBULATORY STATUS: Independent community ambulation without devices  PREVIOUS TREATMENTS: NSAIDS Celebrex with good improvement  HISTORY OF SURGERY ON  AFFECTED KNEE(S): Yes  does have history of a knee scope with meniscectomy several years ago      REVIEW OF SYSTEMS  Constitutional: See HPI for pain assessment, No significant weight loss, recent trauma  Cardiovascular: No chest pain, shortness of breath  Respiratory: No difficulty breathing, cough  Gastrointestinal: No nausea, vomiting, diarrhea, constipation  Musculoskeletal: Noted in HPI  Integumentary: No rashes, easy bruising, redness   Neurological: no numbness or tingling in extremities, no gait disturbances   Psychiatric: No mood changes, memory changes, social issues  Heme/Lymph: no excessive swelling, easy bruising, excessive bleeding  ENT: No hearing changes  Eyes: No vision changes    Medical History[1]     Allergies[2]     Surgical History[3]     Family History[4]     Social History     Socioeconomic History    Marital status:      Spouse name: Not on file    Number of children: Not on file    Years of education: Not on file    Highest education level: Not on file   Occupational History    Not on file   Tobacco Use    Smoking status: Never    Smokeless tobacco: Never   Vaping Use    Vaping status: Never Used   Substance and Sexual Activity    Alcohol use: Never    Drug use: Never    Sexual activity: Defer   Other Topics Concern    Not on file   Social History Narrative    Not on file     Social Drivers of Health     Financial Resource Strain: Not on file   Food Insecurity: Not on file   Transportation Needs: Not on file   Physical Activity: Not on file   Stress: Not on file   Social Connections: Not on file   Intimate Partner Violence: Not on file   Housing Stability: Not on file        CURRENT MEDICATIONS:   Current Medications[5]     OBJECTIVE    PHYSICAL EXAM      10/8/2024     8:39 AM 10/8/2024     8:49 AM 10/8/2024     9:05 AM 10/8/2024     9:31 AM 12/31/2024     7:20 AM 4/7/2025     2:59 PM 6/27/2025     9:20 AM   Vitals   Systolic 114  134 118 130 120 118   Diastolic 69  72 72 80 80 60  "  BP Location     Right arm Left arm    Heart Rate 56 54 67 60 72 65 64   Temp  36.7 °C (98.1 °F)        Resp 14 13 13 16      Height     1.676 m (5' 6\") 1.676 m (5' 6\") 1.676 m (5' 6\")   Weight (lb)     185 186 180   BMI     29.86 kg/m2 30.02 kg/m2 29.05 kg/m2   BSA (m2)     1.98 m2 1.98 m2 1.95 m2   Visit Report     Report Report Report      There is no height or weight on file to calculate BMI.    GENERAL: A/Ox3, NAD. Appears healthy, well nourished  PSYCHIATRIC: Mood stable, appropriate memory recall  EYES: EOM intact  CARDIAC: regular rate  LUNGS: Breathing non-labored  SKIN: no erythema, rashes, or ecchymoses   MUSCULOSKELETAL:  Laterality: right Knee Exam  - Alignment: partially correctible varus deformity  - ROM:  0-110   - Effusion: none  - Strength: knee extension and flexion 5/5, EHL/PF/DF motor intact  - Palpation: TTP along medial joint line  - Stability: Anterior/Posterior stable, varus/valgus stable  - Gait: normal  - Hip Exam: flexion to 100+ degrees, full extension, internal/external rotation adequate, and no pain with log roll  - Special Tests: none performed  NEUROVASCULAR:  - Neurovascular Status: sensation intact to light touch distally      IMAGING:  Multiple views of the affected right knee(s) demonstrate: Moderate to severe tricompartmental osteoarthritis with varus deformity subchondral sclerosis and osteophytosis..   X-rays were personally reviewed and interpreted by me.  Radiology reports were reviewed by me as well, if readily available at the time.        Celeste Chambers PA-C  Physician Assistant  Orthopedic Surgery  [facility]         [1]   Past Medical History:  Diagnosis Date    Abnormal result of other cardiovascular function study     Abnormal stress test    Hyperlipidemia     Hypertension     Otitis externa 06/28/2023    Personal history of other diseases of the musculoskeletal system and connective tissue     History of arthritis    Personal " history of other venous thrombosis and embolism     History of deep venous thrombosis    Subacute otitis media 06/28/2023    Unspecified otitis externa, unspecified ear 03/12/2021    Otitis externa   [2]   Allergies  Allergen Reactions    Atorvastatin Other    Lovastatin Other    Niacin Unknown    Rosuvastatin Other    Statins-Hmg-Coa Reductase Inhibitors Other   [3]   Past Surgical History:  Procedure Laterality Date    BACK SURGERY  09/12/2016    Back Surgery    CORONARY ANGIOPLASTY  09/06/2016    PTCA    TONSILLECTOMY  09/12/2016    Tonsillectomy   [4] No family history on file.  [5]   Current Outpatient Medications   Medication Sig Dispense Refill    aspirin 81 mg chewable tablet Chew and swallow 1 tablet (81 mg) once every 24 hours.      co-enzyme Q-10 (CoQ-10) 30 mg capsule Take 1 capsule (30 mg) by mouth once every 24 hours.      DOCOSAHEXAENOIC ACID ORAL omega 3-dha-epa-fish oil (Fish OiL) 1,000 mg (120 mg-180 mg) capsule 1 capsule (1,000 mg) once every 24 hours. 0 Active      evolocumab (Repatha SureClick) 140 mg/mL injection Inject 1 mL (140 mg) under the skin every 14 (fourteen) days. 6 mL 3    meloxicam (Mobic) 15 mg tablet Take 1 tablet (15 mg) by mouth once daily. 30 tablet 11    metoprolol tartrate (Lopressor) 25 mg tablet Take 0.5 tablets (12.5 mg) by mouth 2 times a day. 90 tablet 3    multivitamin with folic acid (One Daily Multivitamin) 400 mcg tablet Take 1 tablet by mouth once daily.       No current facility-administered medications for this visit.

## 2025-07-14 ENCOUNTER — CLINICAL SUPPORT (OUTPATIENT)
Dept: AUDIOLOGY | Facility: HOSPITAL | Age: 75
End: 2025-07-14
Payer: MEDICARE

## 2025-07-14 DIAGNOSIS — H90.3 HEARING LOSS, SENSORINEURAL, ASYMMETRICAL: Primary | ICD-10-CM

## 2025-07-14 DIAGNOSIS — Z96.21 COCHLEAR IMPLANT IN PLACE: ICD-10-CM

## 2025-07-14 PROCEDURE — 92557 COMPREHENSIVE HEARING TEST: CPT | Performed by: AUDIOLOGIST

## 2025-07-14 PROCEDURE — 92567 TYMPANOMETRY: CPT | Performed by: AUDIOLOGIST

## 2025-07-14 ASSESSMENT — PAIN - FUNCTIONAL ASSESSMENT: PAIN_FUNCTIONAL_ASSESSMENT: 0-10

## 2025-07-14 ASSESSMENT — PAIN SCALES - GENERAL: PAINLEVEL_OUTOF10: 7

## 2025-07-14 NOTE — LETTER
2025         LOURDES Abraham  [address], [address]  [place] [address]      Patient: Joe Pathak   YOB: 1950   Date of Visit: 2025       Dear LOURDES Abraham:    Thank you for referring Joe Pathak to me for evaluation. Below are my notes for this consultation.  If you have questions, please do not hesitate to call me. I look forward to following your patient along with you.       Sincerely,     JAY Clark, CCC-A  Senior Audiologist      CC:   No Recipients  ______________________________________________________________________________________    AUDIOLOGY ADULT AUDIOMETRIC EVALUATION      Name:  Joe Pathak  :  1950  Age:  75 y.o.  Date of Evaluation:  2025      IMPRESSIONS:  Today's test results are consistent with severe to profound sensorineural hearing loss in the left ear and mild to profound sensorineural hearing loss in the right ear.  The word discrimination scores are poor in the left ear and fair in the right ear.  The tympanograms show borderline negative middle ear pressure in the right ear and normal tympanic membrane mobility in the left ear.  Relative to last hearing evaluation of 10-5-2020, there is slight progression of hearing loss with slight improvement of word discrimination in the right ear and significant progression of hearing loss in the left ear.      RECOMMENDATIONS:  -Medical / Otolaryngology consultation, monitor status post cochlear implantation  -Annual audiologic evaluation, as otherwise recommended by otolaryngologist, or as changes are noted.  -Consultation with cochlear implant audiologist for maintenance and testing of his left cochlear implant.  If appropriate, could consider evaluation for candidacy of second cochlear implant.  Could consider use of hearing aid in the right ear.      ------------------------------------------------    HISTORY:  Reason  for visit:  Joe Pathak is seen today at the request of Katelin Benson APRN-C for an evaluation of hearing.  Patient complains of Hearing Loss (Has cochlear implant in the left ear, which seems to help, but does not work as well as it did when it was first fit to him.  )  Patient has not returned for cochlear implant maintenance and testing in the last few years     Tinnitus:   yes, left ear, occurs when he removes the cochlear implant, described as white noise, longstanding, not generally bothersome, not pulsatile     Other otologic surgical history:  yes, cochlear implant left   Noise Exposure: yes, occupational noise exposure  Family history of hearing loss:  yes, father with hearing loss in older adulthood   Hearing aid history: used hearing aids briefly, unsuccessfully, before cochlear implant     Previous hearing test:  yes, from 10-5-2020, showing:  mild to profound sensorineural hearing loss bilaterally. The middle ear pressure in the right ear is borderline negative.     Otalgia:  no  Aural Fullness:  no  Otitis Media: no  PE Tubes:  no    Dizziness:  no  Other significant history: none    EVALUATION    Otoscopic Evaluation:        Clear ear canal, tympanic membrane visualized bilaterally                Pure Tone Audiometry:  Conventional audiometry (125-8000Hz) via insert earphones with good response reliability      Right ear:  mild to profound sensorineural hearing loss        Left ear:  severe to profound sensorineural hearing loss     Speech Audiometry:        Right ear:  Speech Reception Threshold (SRT) was obtained at 60 dBHL                 Word Recognition score was fair at 40dBSL re: SRT       Left ear:  Speech Reception Threshold (SRT) was obtained at 95 dBHL                 Word Recognition score was poor at limits of the audiometer      Immittance Measures: 226Hz       Right ear:  Tympanogram is slightly abnormal, showing borderline negative middle ear pressure with normal ear canal  volume and static compliance.       Left ear:  Tympanogram shows normal middle ear pressure, static compliance, and ear canal volume.    Did not test acoustic reflexes, previously absent bilaterally            Distortion Product Otoacoustic Emissions: Assesses the cochlear outer hair cell function (5807-0771 Hz frequency range)        Did not test, longstanding hearing loss         PATIENT EDUCATION:   Discussed results and recommendations with Joe Pathak.  Questions were addressed and the patient was encouraged to contact our department should concerns arise.    Verified patient's identification verbally and by armband.    Time:  09:53 to 10:32    JAY Clark, CCC-A  Senior Audiologist

## 2025-07-14 NOTE — PROGRESS NOTES
AUDIOLOGY ADULT AUDIOMETRIC EVALUATION      Name:  Joe Pathak  :  1950  Age:  75 y.o.  Date of Evaluation:  2025      IMPRESSIONS:  Today's test results are consistent with severe to profound sensorineural hearing loss in the left ear and mild to profound sensorineural hearing loss in the right ear.  The word discrimination scores are poor in the left ear and fair in the right ear.  The tympanograms show borderline negative middle ear pressure in the right ear and normal tympanic membrane mobility in the left ear.  Relative to last hearing evaluation of 10-5-2020, there is slight progression of hearing loss with slight improvement of word discrimination in the right ear and significant progression of hearing loss in the left ear.      RECOMMENDATIONS:  -Medical / Otolaryngology consultation, monitor status post cochlear implantation  -Annual audiologic evaluation, as otherwise recommended by otolaryngologist, or as changes are noted.  -Consultation with cochlear implant audiologist for maintenance and testing of his left cochlear implant.  If appropriate, could consider evaluation for candidacy of second cochlear implant.  Could consider use of hearing aid in the right ear.      ------------------------------------------------    HISTORY:  Reason for visit:  Joe Pathak is seen today at the request of Katelin Benson APRN-C for an evaluation of hearing.  Patient complains of Hearing Loss (Has cochlear implant in the left ear, which seems to help, but does not work as well as it did when it was first fit to him.  )  Patient has not returned for cochlear implant maintenance and testing in the last few years     Tinnitus:   yes, left ear, occurs when he removes the cochlear implant, described as white noise, longstanding, not generally bothersome, not pulsatile     Other otologic surgical history:  yes, cochlear implant left   Noise Exposure: yes, occupational noise exposure  Family  history of hearing loss:  yes, father with hearing loss in older adulthood   Hearing aid history: used hearing aids briefly, unsuccessfully, before cochlear implant     Previous hearing test:  yes, from 10-5-2020, showing:  mild to profound sensorineural hearing loss bilaterally. The middle ear pressure in the right ear is borderline negative.     Otalgia:  no  Aural Fullness:  no  Otitis Media: no  PE Tubes:  no    Dizziness:  no  Other significant history: none    EVALUATION    Otoscopic Evaluation:        Clear ear canal, tympanic membrane visualized bilaterally                Pure Tone Audiometry:  Conventional audiometry (125-8000Hz) via insert earphones with good response reliability      Right ear:  mild to profound sensorineural hearing loss        Left ear:  severe to profound sensorineural hearing loss     Speech Audiometry:        Right ear:  Speech Reception Threshold (SRT) was obtained at 60 dBHL                 Word Recognition score was fair at 40dBSL re: SRT       Left ear:  Speech Reception Threshold (SRT) was obtained at 95 dBHL                 Word Recognition score was poor at limits of the audiometer      Immittance Measures: 226Hz       Right ear:  Tympanogram is slightly abnormal, showing borderline negative middle ear pressure with normal ear canal volume and static compliance.       Left ear:  Tympanogram shows normal middle ear pressure, static compliance, and ear canal volume.    Did not test acoustic reflexes, previously absent bilaterally            Distortion Product Otoacoustic Emissions: Assesses the cochlear outer hair cell function (6205-1422 Hz frequency range)        Did not test, longstanding hearing loss         PATIENT EDUCATION:   Discussed results and recommendations with Joe Pathak.  Questions were addressed and the patient was encouraged to contact our department should concerns arise.    Verified patient's identification verbally and by armband.    Time:  09:53 to  10:32    JAY Clark, CCC-A  Senior Audiologist

## 2025-07-15 ENCOUNTER — OFFICE VISIT (OUTPATIENT)
Dept: ORTHOPEDIC SURGERY | Facility: HOSPITAL | Age: 75
End: 2025-07-15
Payer: MEDICARE

## 2025-07-15 VITALS — HEIGHT: 66 IN | WEIGHT: 180 LBS | BODY MASS INDEX: 28.93 KG/M2

## 2025-07-15 DIAGNOSIS — M17.11 PRIMARY OSTEOARTHRITIS OF RIGHT KNEE: Primary | ICD-10-CM

## 2025-07-15 PROCEDURE — 2500000004 HC RX 250 GENERAL PHARMACY W/ HCPCS (ALT 636 FOR OP/ED)

## 2025-07-15 PROCEDURE — 99213 OFFICE O/P EST LOW 20 MIN: CPT

## 2025-07-15 PROCEDURE — 1159F MED LIST DOCD IN RCRD: CPT

## 2025-07-15 PROCEDURE — 1036F TOBACCO NON-USER: CPT

## 2025-07-15 PROCEDURE — 20610 DRAIN/INJ JOINT/BURSA W/O US: CPT | Mod: RT

## 2025-07-15 PROCEDURE — 99213 OFFICE O/P EST LOW 20 MIN: CPT | Mod: 25

## 2025-07-15 RX ORDER — LIDOCAINE HYDROCHLORIDE 10 MG/ML
4 INJECTION, SOLUTION INFILTRATION; PERINEURAL
Status: COMPLETED | OUTPATIENT
Start: 2025-07-15 | End: 2025-07-15

## 2025-07-15 RX ORDER — TRIAMCINOLONE ACETONIDE 40 MG/ML
40 INJECTION, SUSPENSION INTRA-ARTICULAR; INTRAMUSCULAR
Status: COMPLETED | OUTPATIENT
Start: 2025-07-15 | End: 2025-07-15

## 2025-07-15 RX ADMIN — LIDOCAINE HYDROCHLORIDE 4 ML: 10 INJECTION, SOLUTION INFILTRATION; PERINEURAL at 15:09

## 2025-07-15 RX ADMIN — TRIAMCINOLONE ACETONIDE 40 MG: 400 INJECTION, SUSPENSION INTRA-ARTICULAR; INTRAMUSCULAR at 15:09

## 2025-07-15 ASSESSMENT — PAIN SCALES - GENERAL: PAINLEVEL_OUTOF10: 5 - MODERATE PAIN

## 2025-07-15 NOTE — PROGRESS NOTES
ORTHOPEDIC FOLLOW UP      ============================  IMPRESSION/PLAN:  ============================  75 y.o. male with right knee primary osteoarthritis    PLAN:  -Discussion I discussed the diagnosis and treatment options with the patient today along with their associated risks and benefits. After thorough discussion, the patient has elected to proceed with a corticosteroid injection with Kenalog and Lidocaine, which was performed in the office today under aseptic technique and the patient tolerated the procedure well.  - Recommend continuing Voltaren up to 4 times daily in combination with oral Celebrex as well as Tylenol as needed.  - Continue knee conditioning home exercise program  - At this time patient may follow-up on an as needed basis.  He is aware that he cannot receive another injection within 3 months.      Patient ID: Joe Pathak is a 75 y.o. male.    L Inj/Asp: R knee on 7/15/2025 3:09 PM  Indications: pain  Details: 25 G needle, anterolateral (Inferolateral) approach  Medications: 40 mg triamcinolone acetonide 40 mg/mL; 4 mL lidocaine 10 mg/mL (1 %)  Outcome: tolerated well, no immediate complications    Prepped with alcohol, bandaid applied to injection site.   Procedure, treatment alternatives, risks and benefits explained, specific risks discussed. Consent was given by the patient.         Joe Pathak presents today for follow up of the above condition.  No changes since his previous visit.  He is here today because he would like a right knee corticosteroid injection.    FUNCTIONAL STATUS: occasionally limited.  AMBULATORY STATUS: Independent community ambulation without devices  PREVIOUS TREATMENTS: NSAIDS celebrex with good improvement  Therapy   still taking  HISTORY OF SURGERY ON AFFECTED KNEE(S): Yes, knee scope with meniscectomy several years ago    Review of Systems:   Constitutional: See HPI for pain assessment, No significant weight loss, recent trauma. Denies  fevers/chills  Cardiovascular: No chest pain, shortness of breath  Respiratory: No difficulty breathing, cough  Gastrointestinal: No nausea, vomiting, diarrhea, constipation  Musculoskeletal: Noted in HPI, no arthralgias   Integumentary: No rashes, easy bruising, redness   Neurological: no numbness or tingling in extremities, no gait disturbances     Problem List[1]    Medical History[2]     Allergies[3]     Surgical History[4]     Family History[5]     Social History     Socioeconomic History    Marital status:      Spouse name: Not on file    Number of children: Not on file    Years of education: Not on file    Highest education level: Not on file   Occupational History    Not on file   Tobacco Use    Smoking status: Never    Smokeless tobacco: Never   Vaping Use    Vaping status: Never Used   Substance and Sexual Activity    Alcohol use: Never    Drug use: Never    Sexual activity: Defer   Other Topics Concern    Not on file   Social History Narrative    Not on file     Social Drivers of Health     Financial Resource Strain: Not on file   Food Insecurity: Not on file   Transportation Needs: Not on file   Physical Activity: Not on file   Stress: Not on file   Social Connections: Not on file   Intimate Partner Violence: Not on file   Housing Stability: Not on file        CURRENT MEDICATIONS:   Current Medications[6]     =================================  EXAM  =================================  GENERAL: A/Ox3, NAD. Appears healthy, well nourished  PSYCHIATRIC: Mood stable, appropriate memory recall  EYES: EOM intact  CARDIAC: regular rate  LUNGS: Breathing non-labored  SKIN: no erythema, rashes, or ecchymoses   MUSCULOSKELETAL:  Laterality: right Knee Exam  - Alignment: partially correctible varus deformity  - ROM:  0-110   - Effusion: none  - Strength: knee extension and flexion 5/5, EHL/PF/DF motor intact  - Palpation: TTP along medial joint line  - Stability: Anterior/Posterior stable, varus/valgus  stable  - Gait: normal  - Hip Exam: flexion to 100+ degrees, full extension, internal/external rotation adequate, and no pain with log roll  - Special Tests: none performed  NEUROVASCULAR:  - Neurovascular Status: sensation intact to light touch distally    Body mass index is 29.05 kg/m².      Celeste Chambers PA-C  Physician Assistant  Orthopedic Surgery  [facility]       [1]   Patient Active Problem List  Diagnosis    Generalized abdominal pain    ASHD (arteriosclerotic heart disease)    Back pain    Cochlear implant in place    Cognitive communication deficit    H/O percutaneous transluminal coronary angioplasty    Hearing loss    Hyperlipidemia    Lower extremity pain    Muscle weakness of lower extremity    Sensorineural hearing loss, bilateral    Vitamin D deficiency    Otitis media    Medicare annual wellness visit, subsequent    Abnormal cardiovascular stress test    Bilateral tinnitus    Eczema of external auditory canal    History of deep venous thrombosis    Symbolic dysfunction   [2]   Past Medical History:  Diagnosis Date    Abnormal result of other cardiovascular function study     Abnormal stress test    Hyperlipidemia     Hypertension     Otitis externa 06/28/2023    Personal history of other diseases of the musculoskeletal system and connective tissue     History of arthritis    Personal history of other venous thrombosis and embolism     History of deep venous thrombosis    Subacute otitis media 06/28/2023    Unspecified otitis externa, unspecified ear 03/12/2021    Otitis externa   [3]   Allergies  Allergen Reactions    Atorvastatin Other    Lovastatin Other    Niacin Unknown    Rosuvastatin Other    Statins-Hmg-Coa Reductase Inhibitors Other   [4]   Past Surgical History:  Procedure Laterality Date    BACK SURGERY  09/12/2016    Back Surgery    CORONARY ANGIOPLASTY  09/06/2016    PTCA    TONSILLECTOMY  09/12/2016    Tonsillectomy   [5] No family history on  file.  [6]   Current Outpatient Medications   Medication Sig Dispense Refill    aspirin 81 mg chewable tablet Chew and swallow 1 tablet (81 mg) once every 24 hours.      co-enzyme Q-10 (CoQ-10) 30 mg capsule Take 1 capsule (30 mg) by mouth once every 24 hours.      DOCOSAHEXAENOIC ACID ORAL omega 3-dha-epa-fish oil (Fish OiL) 1,000 mg (120 mg-180 mg) capsule 1 capsule (1,000 mg) once every 24 hours. 0 Active      evolocumab (Repatha SureClick) 140 mg/mL injection Inject 1 mL (140 mg) under the skin every 14 (fourteen) days. 6 mL 3    meloxicam (Mobic) 15 mg tablet Take 1 tablet (15 mg) by mouth once daily. 30 tablet 11    metoprolol tartrate (Lopressor) 25 mg tablet Take 0.5 tablets (12.5 mg) by mouth 2 times a day. 90 tablet 3    multivitamin with folic acid (One Daily Multivitamin) 400 mcg tablet Take 1 tablet by mouth once daily.       No current facility-administered medications for this visit.

## 2026-01-02 ENCOUNTER — APPOINTMENT (OUTPATIENT)
Dept: PRIMARY CARE | Facility: CLINIC | Age: 76
End: 2026-01-02
Payer: MEDICARE